# Patient Record
Sex: FEMALE | Race: WHITE | NOT HISPANIC OR LATINO | Employment: UNEMPLOYED | URBAN - METROPOLITAN AREA
[De-identification: names, ages, dates, MRNs, and addresses within clinical notes are randomized per-mention and may not be internally consistent; named-entity substitution may affect disease eponyms.]

---

## 2017-03-29 ENCOUNTER — INITIAL PRENATAL (OUTPATIENT)
Dept: OBGYN | Facility: CLINIC | Age: 27
End: 2017-03-29

## 2017-03-29 VITALS
BODY MASS INDEX: 18.96 KG/M2 | DIASTOLIC BLOOD PRESSURE: 60 MMHG | SYSTOLIC BLOOD PRESSURE: 98 MMHG | HEIGHT: 63 IN | WEIGHT: 107 LBS

## 2017-03-29 DIAGNOSIS — Z34.01 ENCOUNTER FOR SUPERVISION OF NORMAL FIRST PREGNANCY IN FIRST TRIMESTER: ICD-10-CM

## 2017-03-29 DIAGNOSIS — N93.8 DUB (DYSFUNCTIONAL UTERINE BLEEDING): ICD-10-CM

## 2017-03-29 PROCEDURE — 76830 TRANSVAGINAL US NON-OB: CPT | Performed by: OBSTETRICS & GYNECOLOGY

## 2017-03-29 PROCEDURE — 99203 OFFICE O/P NEW LOW 30 MIN: CPT | Mod: 25 | Performed by: OBSTETRICS & GYNECOLOGY
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
## 2017-03-29 NOTE — MR AVS SNAPSHOT
"Rigoberto Springer   3/29/2017 8:30 AM   Initial Prenatal   MRN: [id number]    [facility]   Dept Phone:  [phone]    Description:  Female : 1990   Provider:  Kelly Lind M.D.           Allergies as of 3/29/2017     Allergen Noted Reactions    Nkda [No Known Drug Allergy] 2010         You were diagnosed with     DUB (dysfunctional uterine bleeding)   [564981]       Encounter for supervision of normal first pregnancy in first trimester   [572157]         Vital Signs     Blood Pressure Height Weight Body Mass Index Last Menstrual Period Smoking Status    98/60 mmHg 1.6 m (5' 3\") 48.535 kg (107 lb) 18.96 kg/m2 2017 Current Every Day Smoker      Basic Information     Date Of Birth Sex Race Ethnicity Preferred Language    1990 Female White Non- English      Your appointments     Mar 30, 2017  2:00 PM   [facility] 40 [facility] [place] [facility]   [facility] [employer] [address]  [address]  [place] [address]   [phone]            Mar 30, 2017  2:40 PM   EMPLOYEE HEALTH PROVIDER (10) with Michael Montilla D.O.   [facility] [employer] [facility]    [address] [place] [address]  [address]  [place] [address]   [phone]              Problem List              ICD-10-CM Priority Class Noted - Resolved    Bipolar affective (CMS-HCC) F31.9   2009 - Present    acne Z30.09   2009 - Present    Preventative health care Z00.00   2009 - Present    Menstrual irregularity N92.6   2009 - Present    Conjunctivitis H10.9   2010 - Present      Health Maintenance        Date Due Completion Dates    IMM VARICELLA (CHICKENPOX) VACCINE (1 of 2 - 2 Dose Adolescent Series) 2005 ---    IMM INFLUENZA (1) 2016 ---    IMM DTaP/Tdap/Td Vaccine (6 - Td) 3/18/2018 3/18/2008, 7/10/1997, 1993, 1992, 1992    PAP SMEAR 2018, 2014            Current Immunizations     Dtap Vaccine 7/10/1997, " 4/1/1993, 1992, 1992    HPV Quadrivalent Vaccine (GARDASIL) 8/17/2009, 12/1/2008, 1/9/2008    Hepatitis A Vaccine, Ped/Adol 12/1/2008, 3/18/2008    Hepatitis B Vaccine Non-Recombivax (Ped/Adol) 3/17/1998, 8/29/1997, 7/10/1997    IPV 7/10/1997, 4/1/1993, 1992, 1992    MMR Vaccine 7/17/1999, 7/10/1997    Tdap Vaccine 3/18/2008    Tuberculin Skin Test 1/15/2013      Below and/or attached are the medications your provider expects you to take. Review all of your home medications and newly ordered medications with your provider and/or pharmacist. Follow medication instructions as directed by your provider and/or pharmacist. Please keep your medication list with you and share with your provider. Update the information when medications are discontinued, doses are changed, or new medications (including over-the-counter products) are added; and carry medication information at all times in the event of emergency situations     Allergies:  NKDA - (reactions not documented)               Medications  Valid as of: March 29, 2017 -  9:58 AM    Generic Name Brand Name Tablet Size Instructions for use    Albuterol Sulfate (Aero Soln) albuterol 108 (90 BASE) MCG/ACT Inhale 4-6 Puffs by mouth every four hours as needed for Shortness of Breath.        Drospirenone-Ethinyl Estradiol (Tab) ALANNA 3-0.03 MG Take 1 Tab by mouth every day.        Drospirenone-Ethinyl Estradiol (Tab) ALANNA 3-0.03 MG Take 1 Tab by mouth every day.        Hydrocodone-Acetaminophen (Tab) NORCO 5-325 MG Take 1-2 Tabs by mouth every 6 hours as needed.        Phenazopyridine HCl (Tab) PYRIDIUM 200 MG Take 1 Tab by mouth 3 times a day as needed.        Prenatal MV-Min-Fe Fum-FA-DHA   Take  by mouth.        .                 Medicines prescribed today were sent to:     [facility] - [place], [place] - [address]    [address] [place] [address]    Phone: [phone] Fax: [phone]    Open 24 Hours?: No      Medication  refill instructions:       If your prescription bottle indicates you have medication refills left, it is not necessary to call your provider’s office. Please contact your pharmacy and they will refill your medication.    If your prescription bottle indicates you do not have any refills left, you may request refills at any time through one of the following ways: The online Bionanoplus system (except Urgent Care), by calling your provider’s office, or by asking your pharmacy to contact your provider’s office with a refill request. Medication refills are processed only during regular business hours and may not be available until the next business day. Your provider may request additional information or to have a follow-up visit with you prior to refilling your medication.   *Please Note: Medication refills are assigned a new Rx number when refilled electronically. Your pharmacy may indicate that no refills were authorized even though a new prescription for the same medication is available at the pharmacy. Please request the medicine by name with the pharmacy before contacting your provider for a refill.        Referral     A referral request has been sent to our patient care coordination department. Please allow 3-5 business days for us to process this request and contact you either by phone or mail. If you do not hear from us by the 5th business day, please call us at [phone].        Other Notes About Your Plan     Ask about menactra, varicella,            [facility] Access Code: [email or web address]  Expires: 4/28/2017  9:58 AM    Bionanoplus  A secure, online tool to manage your health information     [employer]’s Bionanoplus® is a secure, online tool that connects you to your personalized health information from the privacy of your home -- day or night - making it very easy for you to manage your healthcare. Once the activation process is completed, you can even access your medical information using the Bionanoplus augusto, which  is available for free in the Apple Brady store or Google Play store.     [facility] provides the following levels of access (as shown below):   My Chart Features   [facility] Doctor [facility]  Specialists [facility]  Urgent  Care Non-[facility]  Primary Care  Doctor   Email your healthcare team securely and privately 24/7 X X X    Manage appointments: schedule your next appointment; view details of past/upcoming appointments X      Request prescription refills. X      View recent personal medical records, including lab and immunizations X X X X   View health record, including health history, allergies, medications X X X X   Read reports about your outpatient visits, procedures, consult and ER notes X X X X   See your discharge summary, which is a recap of your hospital and/or ER visit that includes your diagnosis, lab results, and care plan. X X       How to register for [facility]:  1. Go to  [email or web address].  2. Click on the Sign Up Now box, which takes you to the New Member Sign Up page. You will need to provide the following information:  a. Enter your [facility] Access Code exactly as it appears at the top of this page. (You will not need to use this code after you’ve completed the sign-up process. If you do not sign up before the expiration date, you must request a new code.)   b. Enter your date of birth.   c. Enter your home email address.   d. Click Submit, and follow the next screen’s instructions.  3. Create a [facility] ID. This will be your [facility] login ID and cannot be changed, so think of one that is secure and easy to remember.  4. Create a [facility] password. You can change your password at any time.  5. Enter your Password Reset Question and Answer. This can be used at a later time if you forget your password.   6. Enter your e-mail address. This allows you to receive e-mail notifications when new information is available in [facility].  7. Click Sign Up. You can now view your health information.    For  assistance activating your [employer] account, call [phone]        Quit Tobacco Information     Do you want to quit using tobacco?    Quitting tobacco decreases risks of cancer, heart and lung disease, increases life expectancy, improves sense of taste and smell, and increases spending money, among other benefits.    If you are thinking about quitting, we can help.  • [facility] Quit Tobacco Program: 944.741.9037  o Program occurs weekly for four weeks and includes pharmacist consultation on products to support quitting smoking or chewing tobacco. A provider referral is needed for pharmacist consultation.  • Tobacco Users Help Hotline: -800QUIT-NOW (859-3951) or https://[place].quitlogix.org/  o Free, confidential telephone and online coaching for [place] residents. Sessions are designed on a schedule that is convenient for you. Eligible clients receive free nicotine replacement therapy.  • Nationally: www.smokefree.gov  o Information and professional assistance to support both immediate and long-term needs as you become, and remain, a non-smoker. Smokefree.gov allows you to choose the help that best fits your needs.

## 2017-03-30 ENCOUNTER — HOSPITAL ENCOUNTER (OUTPATIENT)
Facility: MEDICAL CENTER | Age: 27
End: 2017-03-30
Attending: PREVENTIVE MEDICINE
Payer: COMMERCIAL

## 2017-03-30 ENCOUNTER — EMPLOYEE HEALTH (OUTPATIENT)
Dept: OCCUPATIONAL MEDICINE | Facility: CLINIC | Age: 27
End: 2017-03-30

## 2017-03-30 ENCOUNTER — EH NON-PROVIDER (OUTPATIENT)
Dept: OCCUPATIONAL MEDICINE | Facility: CLINIC | Age: 27
End: 2017-03-30

## 2017-03-30 DIAGNOSIS — Z02.1 PHYSICAL EXAM, PRE-EMPLOYMENT: ICD-10-CM

## 2017-03-30 DIAGNOSIS — Z02.1 PRE-EMPLOYMENT DRUG SCREENING: ICD-10-CM

## 2017-03-30 LAB
AMP AMPHETAMINE: NORMAL
BAR BARBITURATES: NORMAL
BZO BENZODIAZEPINES: NORMAL
COC COCAINE: NORMAL
INT CON NEG: NORMAL
INT CON POS: NORMAL
MDMA ECSTASY: NORMAL
MET METHAMPHETAMINES: NORMAL
MTD METHADONE: NORMAL
OPI OPIATES: NORMAL
OXY OXYCODONE: NORMAL
PCP PHENCYCLIDINE: NORMAL
POC URINE DRUG SCREEN OCDRS: NEGATIVE
THC: NORMAL

## 2017-03-30 PROCEDURE — 86787 VARICELLA-ZOSTER ANTIBODY: CPT | Performed by: PREVENTIVE MEDICINE

## 2017-03-30 PROCEDURE — 80305 DRUG TEST PRSMV DIR OPT OBS: CPT | Performed by: PREVENTIVE MEDICINE

## 2017-03-30 PROCEDURE — 86480 TB TEST CELL IMMUN MEASURE: CPT | Performed by: PREVENTIVE MEDICINE

## 2017-03-30 PROCEDURE — 99204 OFFICE O/P NEW MOD 45 MIN: CPT | Performed by: PREVENTIVE MEDICINE

## 2017-03-30 NOTE — MR AVS SNAPSHOT
Rigoberto Springer   3/30/2017 2:40 PM   Employee Health   MRN: [id number]    Department:  [facility]   Dept Phone:  [phone]    Description:  Female : 1990   Provider:  Michael Montilla D.O.           Allergies as of 3/30/2017     Allergen Noted Reactions    Nkda [No Known Drug Allergy] 2010         You were diagnosed with     Physical exam, pre-employment   [008671]         Vital Signs     Last Menstrual Period Smoking Status                2017 Current Every Day Smoker          Basic Information     Date Of Birth Sex Race Ethnicity Preferred Language    1990 Female White Non- English      Problem List              ICD-10-CM Priority Class Noted - Resolved    Bipolar affective (CMS-HCC) F31.9   2009 - Present    acne Z30.09   2009 - Present    Preventative health care Z00.00   2009 - Present    Menstrual irregularity N92.6   2009 - Present    Conjunctivitis H10.9   2010 - Present      Health Maintenance        Date Due Completion Dates    IMM VARICELLA (CHICKENPOX) VACCINE (1 of 2 - 2 Dose Adolescent Series) 2005 ---    IMM INFLUENZA (1) 2016 ---    IMM DTaP/Tdap/Td Vaccine (6 - Td) 3/18/2018 3/18/2008, 7/10/1997, 1993, 1992, 1992    PAP SMEAR 2018, 2014            Current Immunizations     Dtap Vaccine 7/10/1997, 1993, 1992, 1992    HPV Quadrivalent Vaccine (GARDASIL) 2009, 2008, 2008    Hepatitis A Vaccine, Ped/Adol 2008, 3/18/2008    Hepatitis B Vaccine Non-Recombivax (Ped/Adol) 3/17/1998, 1997, 7/10/1997    IPV 7/10/1997, 1993, 1992, 1992    MMR Vaccine 1999, 7/10/1997    Tdap Vaccine 3/18/2008    Tuberculin Skin Test 1/15/2013      Below and/or attached are the medications your provider expects you to take. Review all of your home medications and newly ordered medications with your provider and/or pharmacist. Follow medication  instructions as directed by your provider and/or pharmacist. Please keep your medication list with you and share with your provider. Update the information when medications are discontinued, doses are changed, or new medications (including over-the-counter products) are added; and carry medication information at all times in the event of emergency situations     Allergies:  NKDA - (reactions not documented)               Medications  Valid as of: March 30, 2017 -  3:16 PM    Generic Name Brand Name Tablet Size Instructions for use    Albuterol Sulfate (Aero Soln) albuterol 108 (90 BASE) MCG/ACT Inhale 4-6 Puffs by mouth every four hours as needed for Shortness of Breath.        Drospirenone-Ethinyl Estradiol (Tab) ALANNA 3-0.03 MG Take 1 Tab by mouth every day.        Drospirenone-Ethinyl Estradiol (Tab) ALANNA 3-0.03 MG Take 1 Tab by mouth every day.        Hydrocodone-Acetaminophen (Tab) NORCO 5-325 MG Take 1-2 Tabs by mouth every 6 hours as needed.        Phenazopyridine HCl (Tab) PYRIDIUM 200 MG Take 1 Tab by mouth 3 times a day as needed.        Prenatal MV-Min-Fe Fum-FA-DHA   Take  by mouth.        .                 Medicines prescribed today were sent to:     [facility] [address] [place] [address], [place] - [address]    [address] [place] [address]    Phone: [phone] Fax: [phone]    Open 24 Hours?: No      Medication refill instructions:       If your prescription bottle indicates you have medication refills left, it is not necessary to call your provider’s office. Please contact your pharmacy and they will refill your medication.    If your prescription bottle indicates you do not have any refills left, you may request refills at any time through one of the following ways: The online [employer] system (except Urgent Care), by calling your provider’s office, or by asking your pharmacy to contact your provider’s office with a refill request. Medication refills are processed only during regular  business hours and may not be available until the next business day. Your provider may request additional information or to have a follow-up visit with you prior to refilling your medication.   *Please Note: Medication refills are assigned a new Rx number when refilled electronically. Your pharmacy may indicate that no refills were authorized even though a new prescription for the same medication is available at the pharmacy. Please request the medicine by name with the pharmacy before contacting your provider for a refill.        Other Notes About Your Plan     Ask about menactra, varicella,            MyChart Access Code: Activation code not generated  Current MyChart Status: Active          Quit Tobacco Information     Do you want to quit using tobacco?    Quitting tobacco decreases risks of cancer, heart and lung disease, increases life expectancy, improves sense of taste and smell, and increases spending money, among other benefits.    If you are thinking about quitting, we can help.  • [facility] Quit Tobacco [facility]: [phone]  o Program occurs weekly for four weeks and includes pharmacist consultation on products to support quitting smoking or chewing tobacco. A provider referral is needed for pharmacist consultation.  • Tobacco Users Help Hotline: 3-116-QUITNOW (900-5583) or https://[place].quitlogix.org/  o Free, confidential telephone and online coaching for [place] residents. Sessions are designed on a schedule that is convenient for you. Eligible clients receive free nicotine replacement therapy.  • Nationally: www.smokefree.gov  o Information and professional assistance to support both immediate and long-term needs as you become, and remain, a non-smoker. Smokefree.gov allows you to choose the help that best fits your needs.

## 2017-04-01 LAB — VZV IGG SER IA-ACNC: 195 IV

## 2017-04-03 LAB
M TB TUBERC IFN-G BLD QL: NEGATIVE
M TB TUBERC IFN-G/MITOGEN IGNF BLD: -0.01
M TB TUBERC IGNF/MITOGEN IGNF CONTROL: 48.49 [IU]/ML
MITOGEN IGNF BCKGRD COR BLD-ACNC: 0.03 [IU]/ML

## 2017-04-03 NOTE — PROGRESS NOTES
Referral faxed to [facility] on 4/3/17  They will contact patient to schedule appt.  Please check with patient if appt was made/ document. Thank you

## 2017-05-09 ENCOUNTER — HOSPITAL ENCOUNTER (OUTPATIENT)
Facility: MEDICAL CENTER | Age: 27
End: 2017-05-09
Attending: NURSE PRACTITIONER
Payer: MEDICAID

## 2017-05-09 ENCOUNTER — INITIAL PRENATAL (OUTPATIENT)
Dept: OBGYN | Facility: CLINIC | Age: 27
End: 2017-05-09
Payer: MEDICAID

## 2017-05-09 VITALS — BODY MASS INDEX: 18.96 KG/M2 | SYSTOLIC BLOOD PRESSURE: 100 MMHG | DIASTOLIC BLOOD PRESSURE: 60 MMHG | WEIGHT: 107 LBS

## 2017-05-09 DIAGNOSIS — J45.30 MILD PERSISTENT ASTHMA WITHOUT COMPLICATION: ICD-10-CM

## 2017-05-09 DIAGNOSIS — Z34.02 SUPERVISION OF NORMAL FIRST PREGNANCY IN SECOND TRIMESTER: ICD-10-CM

## 2017-05-09 DIAGNOSIS — Z34.02 SUPERVISION OF NORMAL FIRST PREGNANCY IN SECOND TRIMESTER: Primary | ICD-10-CM

## 2017-05-09 DIAGNOSIS — Z34.00 SUPERVISION OF NORMAL FIRST PREGNANCY, ANTEPARTUM: ICD-10-CM

## 2017-05-09 DIAGNOSIS — B00.9 HSV-1 (HERPES SIMPLEX VIRUS 1) INFECTION: ICD-10-CM

## 2017-05-09 LAB
APPEARANCE UR: NORMAL
BILIRUB UR STRIP-MCNC: NORMAL MG/DL
COLOR UR AUTO: NORMAL
GLUCOSE UR STRIP.AUTO-MCNC: NEGATIVE MG/DL
KETONES UR STRIP.AUTO-MCNC: NEGATIVE MG/DL
LEUKOCYTE ESTERASE UR QL STRIP.AUTO: NEGATIVE
NITRITE UR QL STRIP.AUTO: NEGATIVE
PH UR STRIP.AUTO: 7.5 [PH] (ref 5–8)
PROT UR QL STRIP: NORMAL MG/DL
RBC UR QL AUTO: NEGATIVE
SP GR UR STRIP.AUTO: 1
UROBILINOGEN UR STRIP-MCNC: NORMAL MG/DL

## 2017-05-09 PROCEDURE — 81002 URINALYSIS NONAUTO W/O SCOPE: CPT | Performed by: NURSE PRACTITIONER

## 2017-05-09 PROCEDURE — 88175 CYTOPATH C/V AUTO FLUID REDO: CPT

## 2017-05-09 PROCEDURE — 87591 N.GONORRHOEAE DNA AMP PROB: CPT

## 2017-05-09 PROCEDURE — 59401 PR NEW OB VISIT: CPT | Performed by: NURSE PRACTITIONER

## 2017-05-09 PROCEDURE — 87491 CHLMYD TRACH DNA AMP PROBE: CPT

## 2017-05-09 ASSESSMENT — ENCOUNTER SYMPTOMS
MUSCULOSKELETAL NEGATIVE: 1
RESPIRATORY NEGATIVE: 1
NEUROLOGICAL NEGATIVE: 1
EYES NEGATIVE: 1
PSYCHIATRIC NEGATIVE: 1
CARDIOVASCULAR NEGATIVE: 1
GASTROINTESTINAL NEGATIVE: 1
CONSTITUTIONAL NEGATIVE: 1

## 2017-05-09 NOTE — Clinical Note
May 9, 2017      Rigoberto Springer is currently pregnant and being cared for by [facility].     She is medically cleared for:    1. Dental exams and routine cleaning  2. Tooth fillings and extractions as needed  3. Antibiotic therapy as appropriate  4. Local anesthesia  5. X-ray with abdominal shield.    Patient may be administered the followin% Lidocaine with 1:100,000 Epinephrine  4% Septocaine with 1:100,000 Epinephrine  Nitrous Oxide  A narcotic or non-narcotic pain medication  An antibiotic such as penicillin or clindamycin    NO TETRACYCLINE and NO CODEINE, NO IBUPROFEN        Thank you,                  Tiarra Toney C.N.M.

## 2017-05-09 NOTE — MR AVS SNAPSHOT
Rigoberto Springer   2017 9:00 AM   Initial Prenatal   MRN: [id number]    [facility]   Dept Phone:  [phone]    Description:  Female : 1990   Provider:  Tiarra Toney C.N.M.; PC INTAKE           Allergies as of 2017     Allergen Noted Reactions    Nkda [No Known Drug Allergy] 2010         You were diagnosed with     Supervision of normal first pregnancy in second trimester   [3554574]  -  Primary     Supervision of normal first pregnancy, antepartum   [1050441]       HSV-1 (herpes simplex virus 1) infection   [868693]       Mild persistent asthma without complication   [527565]         Vital Signs     Blood Pressure Weight Last Menstrual Period Smoking Status          100/60 mmHg 48.535 kg (107 lb) 2017 Current Every Day Smoker        Basic Information     Date Of Birth Sex Race Ethnicity Preferred Language    1990 Female White Non- English      Your appointments     2017  4:30 PM   OB Follow Up with Tiarra Toney C.N.M.   [facility] [address] [address] [phone]              Problem List              ICD-10-CM Priority Class Noted - Resolved    Bipolar affective (CMS-HCC) F31.9   2009 - Present    acne Z30.09   2009 - Present    Preventative health care Z00.00   2009 - Present    Supervision of normal first pregnancy in second trimester Z34.02 Medium  2017 - Present    HSV-1 (herpes simplex virus 1) infection B00.9 Medium  2017 - Present    Mild persistent asthma without complication J45.30 Medium  2017 - Present      Health Maintenance        Date Due Completion Dates    IMM VARICELLA (CHICKENPOX) VACCINE (1 of 2 - 2 Dose Adolescent Series) 2005 ---    IMM DTaP/Tdap/Td Vaccine (6 - Td) 3/18/2018 3/18/2008, 7/10/1997, 1993, 1992, 1992    PAP SMEAR 2018, 2014            Results     POCT Urinalysis      Component  Value Standard Range & Units    POC Color  Negative    POC Appearance  Negative    POC Leukocyte Esterase negative Negative    POC Nitrites negative Negative    POC Urobiligen  Negative (0.2) mg/dL    POC Protein trace Negative mg/dL    POC Urine PH 7.5 5.0 - 8.0    POC Blood negative Negative    POC Specific Gravity 1.005 <1.005 - >1.030    POC Ketones negative Negative mg/dL    POC Biliruben  Negative mg/dL    POC Glucose negative Negative mg/dL                        Current Immunizations     Dtap Vaccine 7/10/1997, 4/1/1993, 1992, 1992    HPV Quadrivalent Vaccine (GARDASIL) 8/17/2009, 12/1/2008, 1/9/2008    Hepatitis A Vaccine, Ped/Adol 12/1/2008, 3/18/2008    Hepatitis B Vaccine Non-Recombivax (Ped/Adol) 3/17/1998, 8/29/1997, 7/10/1997    IPV 7/10/1997, 4/1/1993, 1992, 1992    MMR Vaccine 7/17/1999, 7/10/1997    Tdap Vaccine 3/18/2008    Tuberculin Skin Test 1/15/2013      Below and/or attached are the medications your provider expects you to take. Review all of your home medications and newly ordered medications with your provider and/or pharmacist. Follow medication instructions as directed by your provider and/or pharmacist. Please keep your medication list with you and share with your provider. Update the information when medications are discontinued, doses are changed, or new medications (including over-the-counter products) are added; and carry medication information at all times in the event of emergency situations     Allergies:  NKDA - (reactions not documented)               Medications  Valid as of: May 09, 2017 - 10:07 AM    Generic Name Brand Name Tablet Size Instructions for use    Albuterol Sulfate (Aero Soln) albuterol 108 (90 BASE) MCG/ACT Inhale 4-6 Puffs by mouth every four hours as needed for Shortness of Breath.        Drospirenone-Ethinyl Estradiol (Tab) ALANNA 3-0.03 MG Take 1 Tab by mouth every day.        Drospirenone-Ethinyl Estradiol (Tab) ALANNA 3-0.03 MG Take 1 Tab  by mouth every day.        Hydrocodone-Acetaminophen (Tab) NORCO 5-325 MG Take 1-2 Tabs by mouth every 6 hours as needed.        Phenazopyridine HCl (Tab) PYRIDIUM 200 MG Take 1 Tab by mouth 3 times a day as needed.        Prenatal MV-Min-Fe Fum-FA-DHA   Take  by mouth.        .                 Medicines prescribed today were sent to:     [facility] [address] [place] [address], [place] - [address]    [address] [place] [address]    Phone: [phone] Fax: [phone]    Open 24 Hours?: No      Medication refill instructions:       If your prescription bottle indicates you have medication refills left, it is not necessary to call your provider’s office. Please contact your pharmacy and they will refill your medication.    If your prescription bottle indicates you do not have any refills left, you may request refills at any time through one of the following ways: The online [employer] system (except Urgent Care), by calling your provider’s office, or by asking your pharmacy to contact your provider’s office with a refill request. Medication refills are processed only during regular business hours and may not be available until the next business day. Your provider may request additional information or to have a follow-up visit with you prior to refilling your medication.   *Please Note: Medication refills are assigned a new Rx number when refilled electronically. Your pharmacy may indicate that no refills were authorized even though a new prescription for the same medication is available at the pharmacy. Please request the medicine by name with the pharmacy before contacting your provider for a refill.        Your To Do List     Future Labs/Procedures Complete By Expires    AFP TETRA  As directed 5/10/2018    PREG CNTR PRENATAL PN  As directed 5/10/2018    THINPREP RFLX HPV ASCUS W/CTNG  As directed 5/10/2018    US-OB 2ND 3RD TRI COMPLETE  As directed 11/9/2017      Referral     A referral request has been sent  to our patient care coordination department. Please allow 3-5 business days for us to process this request and contact you either by phone or mail. If you do not hear from us by the 5th business day, please call us at [phone].           Brevity Access Code: Activation code not generated  Current NebuAdhart Status: Active          Quit Tobacco Information     Do you want to quit using tobacco?    Quitting tobacco decreases risks of cancer, heart and lung disease, increases life expectancy, improves sense of taste and smell, and increases spending money, among other benefits.    If you are thinking about quitting, we can help.  • [facility] Quit Tobacco Program: [phone]  o Program occurs weekly for four weeks and includes pharmacist consultation on products to support quitting smoking or chewing tobacco. A provider referral is needed for pharmacist consultation.  • Tobacco Users Help Hotline: 0-445-QUIT-NOW (571-7555) or https://[place].quitlogix.org/  o Free, confidential telephone and online coaching for [place] residents. Sessions are designed on a schedule that is convenient for you. Eligible clients receive free nicotine replacement therapy.  • Nationally: www.smokefree.gov  o Information and professional assistance to support both immediate and long-term needs as you become, and remain, a non-smoker. Smokefree.gov allows you to choose the help that best fits your needs.

## 2017-05-09 NOTE — PROGRESS NOTES
Pt. Here for NOB visit today.  # [phone]  Pt had a  visit on 3/29/17  Pt. states having some nausea and some sharp pains in abdomen are denies bleeding.   Pharmacy verified.   Pt states didn't have insurance at the time for appt with TATIANA.
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
## 2017-05-11 LAB
C TRACH DNA GENITAL QL NAA+PROBE: NEGATIVE
CYTOLOGY REG CYTOL: NORMAL
N GONORRHOEA DNA GENITAL QL NAA+PROBE: NEGATIVE
SPECIMEN SOURCE: NORMAL

## 2017-05-23 ENCOUNTER — HOSPITAL ENCOUNTER (OUTPATIENT)
Dept: LAB | Facility: MEDICAL CENTER | Age: 27
End: 2017-05-23
Attending: NURSE PRACTITIONER
Payer: MEDICAID

## 2017-05-23 DIAGNOSIS — Z34.02 SUPERVISION OF NORMAL FIRST PREGNANCY IN SECOND TRIMESTER: ICD-10-CM

## 2017-05-23 LAB
ABO GROUP BLD: NORMAL
APPEARANCE UR: ABNORMAL
BACTERIA #/AREA URNS HPF: ABNORMAL /HPF
BASOPHILS # BLD AUTO: 0.9 % (ref 0–1.8)
BASOPHILS # BLD: 0.1 K/UL (ref 0–0.12)
BILIRUB UR QL STRIP.AUTO: NEGATIVE
BLD GP AB SCN SERPL QL: NORMAL
COLOR UR: YELLOW
CULTURE IF INDICATED INDCX: YES UA CULTURE
EOSINOPHIL # BLD AUTO: 0.15 K/UL (ref 0–0.51)
EOSINOPHIL NFR BLD: 1.3 % (ref 0–6.9)
EPI CELLS #/AREA URNS HPF: ABNORMAL /HPF
ERYTHROCYTE [DISTWIDTH] IN BLOOD BY AUTOMATED COUNT: 45.2 FL (ref 35.9–50)
GLUCOSE UR STRIP.AUTO-MCNC: NEGATIVE MG/DL
HBV SURFACE AG SER QL: NEGATIVE
HCT VFR BLD AUTO: 39 % (ref 37–47)
HGB BLD-MCNC: 13.2 G/DL (ref 12–16)
HIV 1+2 AB+HIV1 P24 AG SERPL QL IA: NON REACTIVE
IMM GRANULOCYTES # BLD AUTO: 0.03 K/UL (ref 0–0.11)
IMM GRANULOCYTES NFR BLD AUTO: 0.3 % (ref 0–0.9)
KETONES UR STRIP.AUTO-MCNC: 10 MG/DL
LEUKOCYTE ESTERASE UR QL STRIP.AUTO: ABNORMAL
LYMPHOCYTES # BLD AUTO: 2.34 K/UL (ref 1–4.8)
LYMPHOCYTES NFR BLD: 20.7 % (ref 22–41)
MCH RBC QN AUTO: 30.9 PG (ref 27–33)
MCHC RBC AUTO-ENTMCNC: 33.8 G/DL (ref 33.6–35)
MCV RBC AUTO: 91.3 FL (ref 81.4–97.8)
MICRO URNS: ABNORMAL
MONOCYTES # BLD AUTO: 0.55 K/UL (ref 0–0.85)
MONOCYTES NFR BLD AUTO: 4.9 % (ref 0–13.4)
MUCOUS THREADS #/AREA URNS HPF: ABNORMAL /HPF
NEUTROPHILS # BLD AUTO: 8.14 K/UL (ref 2–7.15)
NEUTROPHILS NFR BLD: 71.9 % (ref 44–72)
NITRITE UR QL STRIP.AUTO: NEGATIVE
NRBC # BLD AUTO: 0 K/UL
NRBC BLD AUTO-RTO: 0 /100 WBC
PH UR STRIP.AUTO: 6 [PH]
PLATELET # BLD AUTO: 267 K/UL (ref 164–446)
PMV BLD AUTO: 10.4 FL (ref 9–12.9)
PROT UR QL STRIP: NEGATIVE MG/DL
RBC # BLD AUTO: 4.27 M/UL (ref 4.2–5.4)
RBC # URNS HPF: ABNORMAL /HPF
RBC UR QL AUTO: NEGATIVE
RH BLD: NORMAL
RUBV AB SER QL: 131.8 IU/ML
SP GR UR STRIP.AUTO: 1.01
TREPONEMA PALLIDUM IGG+IGM AB [PRESENCE] IN SERUM OR PLASMA BY IMMUNOASSAY: NON REACTIVE
WBC # BLD AUTO: 11.3 K/UL (ref 4.8–10.8)
WBC #/AREA URNS HPF: ABNORMAL /HPF

## 2017-05-23 PROCEDURE — 36415 COLL VENOUS BLD VENIPUNCTURE: CPT

## 2017-05-23 PROCEDURE — 81001 URINALYSIS AUTO W/SCOPE: CPT

## 2017-05-23 PROCEDURE — 87340 HEPATITIS B SURFACE AG IA: CPT

## 2017-05-23 PROCEDURE — 86780 TREPONEMA PALLIDUM: CPT

## 2017-05-23 PROCEDURE — 86762 RUBELLA ANTIBODY: CPT

## 2017-05-23 PROCEDURE — 86901 BLOOD TYPING SEROLOGIC RH(D): CPT

## 2017-05-23 PROCEDURE — 87086 URINE CULTURE/COLONY COUNT: CPT

## 2017-05-23 PROCEDURE — 86900 BLOOD TYPING SEROLOGIC ABO: CPT

## 2017-05-23 PROCEDURE — 86850 RBC ANTIBODY SCREEN: CPT

## 2017-05-23 PROCEDURE — 87389 HIV-1 AG W/HIV-1&-2 AB AG IA: CPT

## 2017-05-23 PROCEDURE — 85025 COMPLETE CBC W/AUTO DIFF WBC: CPT

## 2017-05-23 PROCEDURE — 81511 FTL CGEN ABNOR FOUR ANAL: CPT

## 2017-05-25 LAB
# FETUSES US: NORMAL
AFP MOM SERPL: 1
AFP SERPL-MCNC: 40 NG/ML
AGE - REPORTED: 25.3 YR
BACTERIA UR CULT: NORMAL
GA METHOD: NORMAL
GA: 16 WEEKS
HCG MOM SERPL: 0.74
HCG SERPL-ACNC: NORMAL IU/L
IDDM PATIENT QL: NO
INHIBIN A MOM SERPL: 1.86
INHIBIN A SERPL-MCNC: 363 PG/ML
INTEGRATED SCN PATIENT-IMP: NORMAL
PATHOLOGY STUDY: NORMAL
SIGNIFICANT IND 70042: NORMAL
SOURCE SOURCE: NORMAL
U ESTRIOL MOM SERPL: 0.97
U ESTRIOL SERPL-MCNC: 0.91 NG/ML

## 2017-05-26 ENCOUNTER — TELEPHONE (OUTPATIENT)
Dept: OBGYN | Facility: CLINIC | Age: 27
End: 2017-05-26

## 2017-05-26 NOTE — TELEPHONE ENCOUNTER
----- Message from Tiarra Toney C.N.M. sent at 5/25/2017  7:55 AM PDT -----  Please let patient know that this (HIV) result is normal    Pt notified.

## 2017-06-06 ENCOUNTER — ROUTINE PRENATAL (OUTPATIENT)
Dept: OBGYN | Facility: CLINIC | Age: 27
End: 2017-06-06
Payer: MEDICAID

## 2017-06-06 VITALS — SYSTOLIC BLOOD PRESSURE: 116 MMHG | DIASTOLIC BLOOD PRESSURE: 62 MMHG | BODY MASS INDEX: 19.84 KG/M2 | WEIGHT: 112 LBS

## 2017-06-06 DIAGNOSIS — Z34.02 SUPERVISION OF NORMAL FIRST PREGNANCY IN SECOND TRIMESTER: ICD-10-CM

## 2017-06-06 PROCEDURE — 90040 PR PRENATAL FOLLOW UP: CPT | Performed by: NURSE PRACTITIONER

## 2017-06-06 NOTE — MR AVS SNAPSHOT
Rigoberto Springer   2017 3:00 PM   Routine Prenatal   MRN: [id number]    [facility]   Dept Phone:  [phone]    Description:  Female : 1990   Provider:  Tiarra Toney C.N.M.           Allergies as of 2017     Allergen Noted Reactions    Nkda [No Known Drug Allergy] 2010         You were diagnosed with     Supervision of normal first pregnancy in second trimester   [1607365]         Vital Signs     Blood Pressure Weight Last Menstrual Period Smoking Status          116/62 mmHg 50.803 kg (112 lb) 2017 Current Every Day Smoker        Basic Information     Date Of Birth Sex Race Ethnicity Preferred Language    1990 Female White Non- English      Your appointments     2017  2:30 PM   US PREG 60 with PREG CTR US 1   [facility] ([facility])    [facility] [place] [address] [address] [address] [phone]           For [facility] patients only: 1. Please arrive 15 min prior to your appointment time. 2. If you're late, you will be rescheduled for the next available appointment. 3. If you need to reschedule your appointment, please call us at [phone] 48 hours prior to your appointment. 4. Do not bring children as they will not be allowed in exam room. 5. Only one family member may be present in room during exam. 6. The exam will be 30-60 minutes depending on the exam ordered by the physician. 7. The sonographer is not allowed to discuss findings during the exam. Your provider will go over the results with you at your next appointment. 8. The purpose of this ultrasound is to determine if baby is healthy. Diagnostic ultrasounds are NOT to determine the gender of the baby. 9. NO photography or video recording is allowed in exam room. 10. NO cell phones allowed in the exam room. INFORMACION SOBRE SANTIAGO ULTRASONIDO 1. Por favor de llegar 15 minutos antes de santiago argelia. 2. Si  llega tarde, le tenemos que cambiar la argelia para otra fecha. 3. Si necesita cambiar oleary argelia, por favor llame 48 horas antes de la argelia. [phone] 4. Por favor no traer niños. No se permiten en cuarto de Ultrasonido. 5. Solamente se permite matthias persona en el cuarto georgiana el examen. 6. El examen dura 30-60 minutos, dependiendo del examen ordenado por el Doctor. 7. El Sonógrafo no está autorizado hablar sobre oleary examen. Oleary doctor o partera le va explicar los resultados en oleary próxima argelia. 8. El propósito del Ultrasonido es para determinar si oleary francisco viene saludable. No es para determinar el sexo de oleary francisco. 9. Por favor no fotos o cámaras de grabar. 10. No celulares permitidos en el cuarto de examen.            Jul 03, 2017  3:00 PM   OB Follow Up with Tiarra Toney C.N.M.   [facility] [address] [address]   [phone]              Problem List              ICD-10-CM Priority Class Noted - Resolved    Bipolar affective (CMS-HCC) F31.9   8/17/2009 - Present    acne Z30.09   8/17/2009 - Present    Preventative health care Z00.00   8/17/2009 - Present    Supervision of normal first pregnancy in second trimester Z34.02 Medium  5/9/2017 - Present    HSV-1 (herpes simplex virus 1) infection B00.9 Medium  5/9/2017 - Present    Mild persistent asthma without complication J45.30 Medium  5/9/2017 - Present      Health Maintenance        Date Due Completion Dates    IMM VARICELLA (CHICKENPOX) VACCINE (1 of 2 - 2 Dose Adolescent Series) 6/28/2005 ---    IMM PNEUMOCOCCAL 19-64 (ADULT) MEDIUM RISK SERIES (1 of 1 - PPSV23) 6/28/2011 ---    IMM DTaP/Tdap/Td Vaccine (6 - Td) 3/18/2018 3/18/2008, 7/10/1997, 4/1/1993, 1992, 1992    PAP SMEAR 5/9/2020 5/9/2017, 9/30/2015, 7/9/2014            Current Immunizations     Dtap Vaccine 7/10/1997, 4/1/1993, 1992, 1992    HPV Quadrivalent Vaccine (GARDASIL) 8/17/2009, 12/1/2008, 1/9/2008    Hepatitis A Vaccine, Ped/Adol  12/1/2008, 3/18/2008    Hepatitis B Vaccine Non-Recombivax (Ped/Adol) 3/17/1998, 8/29/1997, 7/10/1997    IPV 7/10/1997, 4/1/1993, 1992, 1992    MMR Vaccine 7/17/1999, 7/10/1997    Tdap Vaccine 3/18/2008    Tuberculin Skin Test 1/15/2013      Below and/or attached are the medications your provider expects you to take. Review all of your home medications and newly ordered medications with your provider and/or pharmacist. Follow medication instructions as directed by your provider and/or pharmacist. Please keep your medication list with you and share with your provider. Update the information when medications are discontinued, doses are changed, or new medications (including over-the-counter products) are added; and carry medication information at all times in the event of emergency situations     Allergies:  NKDA - (reactions not documented)               Medications  Valid as of: June 06, 2017 -  3:26 PM    Generic Name Brand Name Tablet Size Instructions for use    Albuterol Sulfate (Aero Soln) albuterol 108 (90 BASE) MCG/ACT Inhale 4-6 Puffs by mouth every four hours as needed for Shortness of Breath.        Drospirenone-Ethinyl Estradiol (Tab) ALANNA 3-0.03 MG Take 1 Tab by mouth every day.        Drospirenone-Ethinyl Estradiol (Tab) ALANNA 3-0.03 MG Take 1 Tab by mouth every day.        Hydrocodone-Acetaminophen (Tab) NORCO 5-325 MG Take 1-2 Tabs by mouth every 6 hours as needed.        Phenazopyridine HCl (Tab) PYRIDIUM 200 MG Take 1 Tab by mouth 3 times a day as needed.        Prenatal MV-Min-Fe Fum-FA-DHA   Take  by mouth.        .                 Medicines prescribed today were sent to:     [facility] - [place], [place] - [address]    [address] [place] [address]    Phone: [phone] Fax: [phone]    Open 24 Hours?: No      Medication refill instructions:       If your prescription bottle indicates you have medication refills left, it is not necessary to call your  provider’s office. Please contact your pharmacy and they will refill your medication.    If your prescription bottle indicates you do not have any refills left, you may request refills at any time through one of the following ways: The online [employer] system (except Urgent Care), by calling your provider’s office, or by asking your pharmacy to contact your provider’s office with a refill request. Medication refills are processed only during regular business hours and may not be available until the next business day. Your provider may request additional information or to have a follow-up visit with you prior to refilling your medication.   *Please Note: Medication refills are assigned a new Rx number when refilled electronically. Your pharmacy may indicate that no refills were authorized even though a new prescription for the same medication is available at the pharmacy. Please request the medicine by name with the pharmacy before contacting your provider for a refill.        Other Notes About Your Plan     PNL WNL, AFP WNL           MyChart Access Code: Activation code not generated  Current [employer]ioMx Status: Active          Quit Tobacco Information     Do you want to quit using tobacco?    Quitting tobacco decreases risks of cancer, heart and lung disease, increases life expectancy, improves sense of taste and smell, and increases spending money, among other benefits.    If you are thinking about quitting, we can help.  • [facility] Quit Tobacco Program: [phone]  o Program occurs weekly for four weeks and includes pharmacist consultation on products to support quitting smoking or chewing tobacco. A provider referral is needed for pharmacist consultation.  • Tobacco Users Help Hotline: 1-315-QUIT-NOW (597-4700) or https://[place].quitlogix.org/  o Free, confidential telephone and online coaching for [place] residents. Sessions are designed on a schedule that is convenient for you. Eligible clients receive free nicotine  replacement therapy.  • Nationally: www.smokefree.gov  o Information and professional assistance to support both immediate and long-term needs as you become, and remain, a non-smoker. Smokefree.gov allows you to choose the help that best fits your needs.

## 2017-06-06 NOTE — PROGRESS NOTES
S) Pt is a 26 y.o.   at 18w0d  gestation. Routine prenatal care today. No complaints today. Just started feeling movement.    Fetal movement Normal  Cramping no,  VB no  LOF no   Denies dysuria. Generally feels well today. Good self-care activities identified. Denies headaches, swelling, visual changes, or epigastric pain .     O) Blood pressure 116/62, weight 50.803 kg (112 lb), last menstrual period 2017.        Labs:       PNL: WNL       GCT: Not done yet       AFP: Normal       GBS: N/A       Pertinent ultrasound -        Scheduled 17    A) IUP at 18w0d       S=D         Patient Active Problem List    Diagnosis Date Noted   • Supervision of normal first pregnancy in second trimester 2017     Priority: Medium   • HSV-1 (herpes simplex virus 1) infection 2017     Priority: Medium   • Mild persistent asthma without complication 2017     Priority: Medium   • Bipolar affective (CMS-[facility]) 2009   • acne 2009   • Preventative health care 2009                 TDAP: not yet       BTL: no       : n/a    P) s/s ptl vs general discomforts. Fetal movements reviewed. General ed and anticipatory guidance. Nutrition/exercise/vitamin. Plans breast Plans pp contraception- unsure.  Continue PNV.

## 2017-06-06 NOTE — PROGRESS NOTES
Ob F/U  Pt c/o swelling in legs and feet.   Good phone [phone]  U/S scheduled for 6/21/2017  PNP and AFP done

## 2017-06-21 ENCOUNTER — HOSPITAL ENCOUNTER (OUTPATIENT)
Dept: RADIOLOGY | Facility: MEDICAL CENTER | Age: 27
End: 2017-06-21
Attending: NURSE PRACTITIONER
Payer: MEDICAID

## 2017-06-21 DIAGNOSIS — Z34.02 SUPERVISION OF NORMAL FIRST PREGNANCY IN SECOND TRIMESTER: ICD-10-CM

## 2017-06-21 PROCEDURE — 76805 OB US >/= 14 WKS SNGL FETUS: CPT

## 2017-06-22 ENCOUNTER — DATING (OUTPATIENT)
Dept: OBGYN | Facility: CLINIC | Age: 27
End: 2017-06-22

## 2017-07-03 ENCOUNTER — ROUTINE PRENATAL (OUTPATIENT)
Dept: OBGYN | Facility: CLINIC | Age: 27
End: 2017-07-03
Payer: MEDICAID

## 2017-07-03 VITALS — BODY MASS INDEX: 20.91 KG/M2 | WEIGHT: 118 LBS | DIASTOLIC BLOOD PRESSURE: 60 MMHG | SYSTOLIC BLOOD PRESSURE: 102 MMHG

## 2017-07-03 DIAGNOSIS — Z34.02 SUPERVISION OF NORMAL FIRST PREGNANCY IN SECOND TRIMESTER: ICD-10-CM

## 2017-07-03 PROCEDURE — 90040 PR PRENATAL FOLLOW UP: CPT | Performed by: NURSE PRACTITIONER

## 2017-07-03 NOTE — PROGRESS NOTES
OB f/u. + fetal movement.  No VB, LOF or UC's.  Wt: 118 lbs       BP: 102/60  Good phone # [phone]  Preferred pharmacy confirmed.  Pulmonology referral: patient given information to schedule appt.

## 2017-07-03 NOTE — MR AVS SNAPSHOT
Rigoberto Springer   7/3/2017 3:00 PM   Routine Prenatal   MRN: [id number]    [facility]   Dept Phone:  [phone]    Description:  Female : 1990   Provider:  Tiarra Toney C.N.M.           Allergies as of 7/3/2017     Allergen Noted Reactions    Nkda [No Known Drug Allergy] 2010         You were diagnosed with     Supervision of normal first pregnancy in second trimester   [4924825]         Vital Signs     Blood Pressure Weight Last Menstrual Period Smoking Status          102/60 mmHg 53.524 kg (118 lb) 2017 Current Every Day Smoker        Basic Information     Date Of Birth Sex Race Ethnicity Preferred Language    1990 Female White Non- English      Your appointments     2017 11:15 AM   OB Follow Up with Tiarra Toney C.N.M.   [facility] [address]  [facility] [id number] [phone]              Problem List              ICD-10-CM Priority Class Noted - Resolved    Bipolar affective (CMS-HCC) F31.9   2009 - Present    acne Z30.09   2009 - Present    Preventative health care Z00.00   2009 - Present    Supervision of normal first pregnancy in second trimester Z34.02 Medium  2017 - Present    HSV-1 (herpes simplex virus 1) infection B00.9 Medium  2017 - Present    Mild persistent asthma without complication J45.30 Medium  2017 - Present      Health Maintenance        Date Due Completion Dates    IMM VARICELLA (CHICKENPOX) VACCINE (1 of 2 - 2 Dose Adolescent Series) 2005 ---    IMM PNEUMOCOCCAL 19-64 (ADULT) MEDIUM RISK SERIES (1 of 1 - PPSV23) 2011 ---    IMM INFLUENZA (1) 2017 ---    IMM DTaP/Tdap/Td Vaccine (6 - Td) 3/18/2018 3/18/2008, 7/10/1997, 1993, 1992, 1992    PAP SMEAR 2020, 2015, 2014            Current Immunizations     Dtap Vaccine 7/10/1997, 1993, 1992, 1992    HPV Quadrivalent Vaccine (GARDASIL)  8/17/2009, 12/1/2008, 1/9/2008    Hepatitis A Vaccine, Ped/Adol 12/1/2008, 3/18/2008    Hepatitis B Vaccine Non-Recombivax (Ped/Adol) 3/17/1998, 8/29/1997, 7/10/1997    IPV 7/10/1997, 4/1/1993, 1992, 1992    MMR Vaccine 7/17/1999, 7/10/1997    Tdap Vaccine 3/18/2008    Tuberculin Skin Test 1/15/2013      Below and/or attached are the medications your provider expects you to take. Review all of your home medications and newly ordered medications with your provider and/or pharmacist. Follow medication instructions as directed by your provider and/or pharmacist. Please keep your medication list with you and share with your provider. Update the information when medications are discontinued, doses are changed, or new medications (including over-the-counter products) are added; and carry medication information at all times in the event of emergency situations     Allergies:  NKDA - (reactions not documented)               Medications  Valid as of: July 03, 2017 -  3:35 PM    Generic Name Brand Name Tablet Size Instructions for use    Albuterol Sulfate (Aero Soln) albuterol 108 (90 BASE) MCG/ACT Inhale 4-6 Puffs by mouth every four hours as needed for Shortness of Breath.        Drospirenone-Ethinyl Estradiol (Tab) ALANNA 3-0.03 MG Take 1 Tab by mouth every day.        Drospirenone-Ethinyl Estradiol (Tab) ALANNA 3-0.03 MG Take 1 Tab by mouth every day.        Hydrocodone-Acetaminophen (Tab) NORCO 5-325 MG Take 1-2 Tabs by mouth every 6 hours as needed.        Phenazopyridine HCl (Tab) PYRIDIUM 200 MG Take 1 Tab by mouth 3 times a day as needed.        Prenatal MV-Min-Fe Fum-FA-DHA   Take  by mouth.        .                 Medicines prescribed today were sent to:     [facility] [place] [place] - [address]    [address] [place] [address]    Phone: [phone] Fax: [phone]    Open 24 Hours?: No      Medication refill instructions:       If your prescription bottle indicates you have  medication refills left, it is not necessary to call your provider’s office. Please contact your pharmacy and they will refill your medication.    If your prescription bottle indicates you do not have any refills left, you may request refills at any time through one of the following ways: The online Nitro PDF system (except Urgent Care), by calling your provider’s office, or by asking your pharmacy to contact your provider’s office with a refill request. Medication refills are processed only during regular business hours and may not be available until the next business day. Your provider may request additional information or to have a follow-up visit with you prior to refilling your medication.   *Please Note: Medication refills are assigned a new Rx number when refilled electronically. Your pharmacy may indicate that no refills were authorized even though a new prescription for the same medication is available at the pharmacy. Please request the medicine by name with the pharmacy before contacting your provider for a refill.        Other Notes About Your Plan     PNL WNL, AFP WNL           MyChart Access Code: Activation code not generated  Current Nitro PDF Status: Active          Quit Tobacco Information     Do you want to quit using tobacco?    Quitting tobacco decreases risks of cancer, heart and lung disease, increases life expectancy, improves sense of taste and smell, and increases spending money, among other benefits.    If you are thinking about quitting, we can help.  • [facility] Quit Tobacco Program: [phone]  o Program occurs weekly for four weeks and includes pharmacist consultation on products to support quitting smoking or chewing tobacco. A provider referral is needed for pharmacist consultation.  • Tobacco Users Help Hotline: 1-775-QUIT-NOW (699-5796) or https://[place].quitlogix.org/  o Free, confidential telephone and online coaching for [place] residents. Sessions are designed on a schedule that is  convenient for you. Eligible clients receive free nicotine replacement therapy.  • Nationally: www.smokefree.gov  o Information and professional assistance to support both immediate and long-term needs as you become, and remain, a non-smoker. Smokefree.gov allows you to choose the help that best fits your needs.

## 2017-07-03 NOTE — PROGRESS NOTES
S) Pt is a 27 y.o.   at 21w6d  gestation. Routine prenatal care today. No complaints today. Discussed weight gain as she is concerned. Discussed importance of healthy diet and daily exercise.    Fetal movement Normal  Cramping no,  VB no  LOF no   Denies dysuria. Generally feels well today. Good self-care activities identified. Denies headaches, swelling, visual changes, or epigastric pain .     O) Blood pressure 102/60, weight 53.524 kg (118 lb), last menstrual period 2017.        Labs:       PNL: WNL       GCT: Not yet       AFP: Normal       GBS: N/A       Pertinent ultrasound -        17- Survey WNL, YASMEEN 17, c/w prev dating    A) IUP at 21w6d       S=D         Patient Active Problem List    Diagnosis Date Noted   • Supervision of normal first pregnancy in second trimester 2017     Priority: Medium   • HSV-1 (herpes simplex virus 1) infection 2017     Priority: Medium   • Mild persistent asthma without complication 2017     Priority: Medium   • Bipolar affective (CMS-[facility]) 2009   • acne 2009   • Preventative health care 2009                 TDAP: not yet       BTL: no       : n/a    P) s/s ptl vs general discomforts. Fetal movements reviewed. General ed and anticipatory guidance. Nutrition/exercise/vitamin. Plans breast Plans pp contraception- unsure  Continue PNV.

## 2017-07-31 ENCOUNTER — HOSPITAL ENCOUNTER (OUTPATIENT)
Dept: LAB | Facility: MEDICAL CENTER | Age: 27
End: 2017-07-31
Attending: NURSE PRACTITIONER
Payer: MEDICAID

## 2017-07-31 ENCOUNTER — ROUTINE PRENATAL (OUTPATIENT)
Dept: OBGYN | Facility: CLINIC | Age: 27
End: 2017-07-31
Payer: MEDICAID

## 2017-07-31 VITALS — DIASTOLIC BLOOD PRESSURE: 60 MMHG | BODY MASS INDEX: 21.62 KG/M2 | SYSTOLIC BLOOD PRESSURE: 108 MMHG | WEIGHT: 122 LBS

## 2017-07-31 DIAGNOSIS — Z34.02 SUPERVISION OF NORMAL FIRST PREGNANCY IN SECOND TRIMESTER: Primary | ICD-10-CM

## 2017-07-31 DIAGNOSIS — Z34.02 SUPERVISION OF NORMAL FIRST PREGNANCY IN SECOND TRIMESTER: ICD-10-CM

## 2017-07-31 LAB
GLUCOSE 1H P 50 G GLC PO SERPL-MCNC: 110 MG/DL (ref 70–139)
HCT VFR BLD AUTO: 39.3 % (ref 37–47)
HGB BLD-MCNC: 12.8 G/DL (ref 12–16)
TREPONEMA PALLIDUM IGG+IGM AB [PRESENCE] IN SERUM OR PLASMA BY IMMUNOASSAY: NON REACTIVE

## 2017-07-31 PROCEDURE — 36415 COLL VENOUS BLD VENIPUNCTURE: CPT

## 2017-07-31 PROCEDURE — 85018 HEMOGLOBIN: CPT

## 2017-07-31 PROCEDURE — 86780 TREPONEMA PALLIDUM: CPT

## 2017-07-31 PROCEDURE — 82950 GLUCOSE TEST: CPT

## 2017-07-31 PROCEDURE — 90040 PR PRENATAL FOLLOW UP: CPT | Performed by: NURSE PRACTITIONER

## 2017-07-31 PROCEDURE — 85014 HEMATOCRIT: CPT

## 2017-07-31 NOTE — PROGRESS NOTES
Pt here today for OB follow up  Reports +FM  WT: 122 lb  BP: 108/60  1 hr gtt, H/H, and T.Pallidum lab slip given today with instructions.   Pt states had a lillte bit of bleeding after having sex this weekend. Also having a rash with itching on her right leg.  David # [phone]

## 2017-07-31 NOTE — MR AVS SNAPSHOT
Rigoberto Springer   2017 8:30 AM   Routine Prenatal   MRN: [id number]    Department[facility]   Dept Phone:  [phone]    Description:  Female : 1990   Provider:  Tiarra Toney C.N.M.           Allergies as of 2017     Allergen Noted Reactions    Nkda [No Known Drug Allergy] 2010         You were diagnosed with     Supervision of normal first pregnancy in second trimester   [[id number]]  -  Primary       Vital Signs     Blood Pressure Weight Last Menstrual Period Smoking Status          108/60 mmHg 55.339 kg (122 lb) 2017 Current Every Day Smoker        Basic Information     Date Of Birth Sex Race Ethnicity Preferred Language    1990 Female White Non- English      Problem List              ICD-10-CM Priority Class Noted - Resolved    Bipolar affective (CMS-HCC) F31.9 Medium  2009 - Present    acne Z30.09 Medium  2009 - Present    Preventative health care Z00.00   2009 - Present    Supervision of normal first pregnancy in second trimester Z34.02 Medium  2017 - Present    HSV-1 (herpes simplex virus 1) infection B00.9 Medium  2017 - Present    Mild persistent asthma without complication J45.30 Medium  2017 - Present      Health Maintenance        Date Due Completion Dates    IMM VARICELLA (CHICKENPOX) VACCINE (1 of 2 - 2 Dose Adolescent Series) 2005 ---    IMM PNEUMOCOCCAL 19-64 (ADULT) MEDIUM RISK SERIES (1 of 1 - PPSV23) 2011 ---    IMM INFLUENZA (1) 2017 ---    IMM DTaP/Tdap/Td Vaccine (6 - Td) 3/18/2018 3/18/2008, 7/10/1997, 1993, 1992, 1992    PAP SMEAR 2020, 2015, 2014            Current Immunizations     Dtap Vaccine 7/10/1997, 1993, 1992, 1992    HPV Quadrivalent Vaccine (GARDASIL) 2009, 2008, 2008    Hepatitis A Vaccine, Ped/Adol 2008, 3/18/2008    Hepatitis B Vaccine Non-Recombivax (Ped/Adol) 3/17/1998, 1997, 7/10/1997    IPV  7/10/1997, 4/1/1993, 1992, 1992    MMR Vaccine 7/17/1999, 7/10/1997    Tdap Vaccine 3/18/2008    Tuberculin Skin Test 1/15/2013      Below and/or attached are the medications your provider expects you to take. Review all of your home medications and newly ordered medications with your provider and/or pharmacist. Follow medication instructions as directed by your provider and/or pharmacist. Please keep your medication list with you and share with your provider. Update the information when medications are discontinued, doses are changed, or new medications (including over-the-counter products) are added; and carry medication information at all times in the event of emergency situations     Allergies:  NKDA - (reactions not documented)               Medications  Valid as of: July 31, 2017 - 10:38 AM    Generic Name Brand Name Tablet Size Instructions for use    Albuterol Sulfate (Aero Soln) albuterol 108 (90 BASE) MCG/ACT Inhale 4-6 Puffs by mouth every four hours as needed for Shortness of Breath.        Drospirenone-Ethinyl Estradiol (Tab) ALANNA 3-0.03 MG Take 1 Tab by mouth every day.        Drospirenone-Ethinyl Estradiol (Tab) ALANNA 3-0.03 MG Take 1 Tab by mouth every day.        Hydrocodone-Acetaminophen (Tab) NORCO 5-325 MG Take 1-2 Tabs by mouth every 6 hours as needed.        Phenazopyridine HCl (Tab) PYRIDIUM 200 MG Take 1 Tab by mouth 3 times a day as needed.        Prenatal MV-Min-Fe Fum-FA-DHA   Take  by mouth.        .                 Medicines prescribed today were sent to:     [facility] [place] - [address]    [address] [place] [address]    Phone: [phone] Fax: [phone]    Open 24 Hours?: No      Medication refill instructions:       If your prescription bottle indicates you have medication refills left, it is not necessary to call your provider’s office. Please contact your pharmacy and they will refill your medication.    If your prescription  bottle indicates you do not have any refills left, you may request refills at any time through one of the following ways: The online Slidebeant system (except Urgent Care), by calling your provider’s office, or by asking your pharmacy to contact your provider’s office with a refill request. Medication refills are processed only during regular business hours and may not be available until the next business day. Your provider may request additional information or to have a follow-up visit with you prior to refilling your medication.   *Please Note: Medication refills are assigned a new Rx number when refilled electronically. Your pharmacy may indicate that no refills were authorized even though a new prescription for the same medication is available at the pharmacy. Please request the medicine by name with the pharmacy before contacting your provider for a refill.        Your To Do List     Future Labs/Procedures Complete By Expires    GLUCOSE 1HR GESTATIONAL  As directed 8/1/2018    HCT  As directed 8/1/2018    HGB  As directed 8/1/2018    T.PALLIDUM AB EIA  As directed 8/1/2018      Other Notes About Your Plan     PNL WNL, AFP WNL           MyChart Access Code: Activation code not generated  Current Rapp IT Uphart Status: Active          Quit Tobacco Information     Do you want to quit using tobacco?    Quitting tobacco decreases risks of cancer, heart and lung disease, increases life expectancy, improves sense of taste and smell, and increases spending money, among other benefits.    If you are thinking about quitting, we can help.  • [facility] Quit Tobacco Program: [phone]  o Program occurs weekly for four weeks and includes pharmacist consultation on products to support quitting smoking or chewing tobacco. A provider referral is needed for pharmacist consultation.  • Tobacco Users Help Hotline: 6-969-QUIT-NOW (539-6871) or https://[place].quitlogix.org/  o Free, confidential telephone and online coaching for [place]  residents. Sessions are designed on a schedule that is convenient for you. Eligible clients receive free nicotine replacement therapy.  • Nationally: www.smokefree.gov  o Information and professional assistance to support both immediate and long-term needs as you become, and remain, a non-smoker. Smokefree.gov allows you to choose the help that best fits your needs.

## 2017-07-31 NOTE — PROGRESS NOTES
S) Pt is a 27 y.o.   at 25w6d  gestation. Routine prenatal care today. Complains of a rash/hives on the back of her right calf. Cortisone cream helps. Denies any new lotions, soaps, or clothing. Will trial benadryl to see if this helps. No breaks in the skin or bug bites noted. Also said she has had some spotting after intercourse. Discussed normalcy of spotting and warning signs to watch out for.   Fetal movement Normal  Cramping no,  VB no  LOF no   Denies dysuria. Generally feels well today. Good self-care activities identified. Denies headaches, swelling, visual changes, or epigastric pain .     O) Blood pressure 108/60, weight 55.339 kg (122 lb), last menstrual period 2017.        Labs:       PNL: WNL       GCT: Going today       AFP: Normal       GBS: N/A       Pertinent ultrasound -        17- Survey WNL, YASMEEN 17cm, c/w prev dating    A) IUP at 25w6d       S=D         Patient Active Problem List    Diagnosis Date Noted   • Supervision of normal first pregnancy in second trimester 2017     Priority: Medium   • HSV-1 (herpes simplex virus 1) infection 2017     Priority: Medium   • Mild persistent asthma without complication 2017     Priority: Medium   • Bipolar affective (CMS-[facility]) 2009     Priority: Medium   • acne 2009     Priority: Medium   • Preventative health care 2009                 TDAP: no       BTL: no       : n/a    P) s/s ptl vs general discomforts. Fetal movements reviewed. General ed and anticipatory guidance. Nutrition/exercise/vitamin. Plans breast Plans pp contraception- unsure  Continue PNV.

## 2017-08-07 ENCOUNTER — TELEPHONE (OUTPATIENT)
Dept: OBGYN | Facility: CLINIC | Age: 27
End: 2017-08-07

## 2017-08-07 NOTE — TELEPHONE ENCOUNTER
Pt calling c/o a rash that is turning in hives. Pt has tried Cortisone cream and Benadryl , but states that its not helping. Consulted with midwife Tiarra and informed the pt that she recommends for her to be seen at urgent care or her primary care provider due to her rash getting worse. Pt verbalized understanding and states no other concerns.

## 2017-08-14 ENCOUNTER — TELEPHONE (OUTPATIENT)
Dept: OBGYN | Facility: CLINIC | Age: 27
End: 2017-08-14

## 2017-08-14 NOTE — TELEPHONE ENCOUNTER
Pt called Triage line left message regarding a rush. Reviewed pt's chart and pt spoke to Rebecca QUINTANA on 8/7/17 and per NILESH Toney CNM pt was advised to f/u with PCP or ER/Urgent Care. Called pt back pt states her rush is not getting better and called [employer] to see which urgent care took her medicaid but there is no urgent care that will take her medicaid. Consulted with MONTY Hutchison CNM and again pt is advised to f/u with PCP since she was already seen by us and rash is not improving, provider advised pt f/u with PCP and they can refer her to dermatologist if necessary. Per Zohra QUINTANA looked up on [employer] and [facility] and [facility] both take [employer] and they both take walk-ins. Pt notified. Pt states [facility] is where she has her PCP and she was told they can not see given her an appt until 2-3wks. Explained to pt she should do a walk-in. Pt agreed and verbalized understanding.

## 2017-08-16 ENCOUNTER — OFFICE VISIT (OUTPATIENT)
Dept: URGENT CARE | Facility: CLINIC | Age: 27
End: 2017-08-16

## 2017-08-16 VITALS
HEIGHT: 63 IN | OXYGEN SATURATION: 96 % | SYSTOLIC BLOOD PRESSURE: 102 MMHG | DIASTOLIC BLOOD PRESSURE: 68 MMHG | TEMPERATURE: 98.4 F | WEIGHT: 122 LBS | BODY MASS INDEX: 21.62 KG/M2 | RESPIRATION RATE: 14 BRPM | HEART RATE: 99 BPM

## 2017-08-16 DIAGNOSIS — Z3A.28 28 WEEKS GESTATION OF PREGNANCY: ICD-10-CM

## 2017-08-16 DIAGNOSIS — L20.9 ATOPIC DERMATITIS, UNSPECIFIED TYPE: ICD-10-CM

## 2017-08-16 PROCEDURE — 99214 OFFICE O/P EST MOD 30 MIN: CPT | Performed by: PHYSICIAN ASSISTANT

## 2017-08-16 RX ORDER — TRIAMCINOLONE ACETONIDE OINTMENT USP, 0.05% 0.5 MG/G
1 OINTMENT TOPICAL 2 TIMES DAILY
Qty: 10 G | Refills: 1 | Status: SHIPPED | OUTPATIENT
Start: 2017-08-16 | End: 2017-08-16 | Stop reason: RX

## 2017-08-16 RX ORDER — TRIAMCINOLONE ACETONIDE 0.25 MG/G
1 CREAM TOPICAL 2 TIMES DAILY
Qty: 1 TUBE | Refills: 1 | OUTPATIENT
Start: 2017-08-16 | End: 2017-09-12

## 2017-08-16 ASSESSMENT — ENCOUNTER SYMPTOMS
FEVER: 0
CHILLS: 0
ABDOMINAL PAIN: 0
DIARRHEA: 0
RESPIRATORY NEGATIVE: 1
VOMITING: 0
NAUSEA: 0
CARDIOVASCULAR NEGATIVE: 1

## 2017-08-16 NOTE — MR AVS SNAPSHOT
"        Rigoberto Springer   2017 4:55 PM   Office Visit   MRN: [id number]    Department:  [facility]   Dept Phone:  [phone]    Description:  Female : 1990   Provider:  Addison Singer PA-C           Reason for Visit     Rash           Allergies as of 2017     Allergen Noted Reactions    Nkda [No Known Drug Allergy] 2010         You were diagnosed with     Atopic dermatitis, unspecified type   [[id number]]       28 weeks gestation of pregnancy   [141562]         Vital Signs     Blood Pressure Pulse Temperature Respirations Height Weight    102/68 mmHg 99 36.9 °C (98.4 °F) 14 1.6 m (5' 2.99\") 55.339 kg (122 lb)    Body Mass Index Oxygen Saturation Last Menstrual Period Smoking Status          21.62 kg/m2 96% 2017 Former Smoker        Basic Information     Date Of Birth Sex Race Ethnicity Preferred Language    1990 Female White Non- English      Your appointments     Aug 29, 2017  4:00 PM   OB Follow Up with NILESH MartinNESTELA   [facility] [address] [address]   [phone]              Problem List              ICD-10-CM Priority Class Noted - Resolved    Bipolar affective (CMS-HCC) F31.9 Medium  2009 - Present    acne Z30.09 Medium  2009 - Present    Preventative health care Z00.00   2009 - Present    Supervision of normal first pregnancy in second trimester Z34.02 Medium  2017 - Present    HSV-1 (herpes simplex virus 1) infection B00.9 Medium  2017 - Present    Mild persistent asthma without complication J45.30 Medium  2017 - Present      Health Maintenance        Date Due Completion Dates    IMM VARICELLA (CHICKENPOX) VACCINE (1 of 2 - 2 Dose Adolescent Series) 2005 ---    IMM PNEUMOCOCCAL 19-64 (ADULT) MEDIUM RISK SERIES (1 of 1 - PPSV23) 2011 ---    IMM INFLUENZA (1) 2017 ---    IMM DTaP/Tdap/Td Vaccine (6 - Td) 3/18/2018 3/18/2008, 7/10/1997, 1993, 1992, " 1992    PAP SMEAR 5/9/2020 5/9/2017, 9/30/2015, 7/9/2014            Current Immunizations     Dtap Vaccine 7/10/1997, 4/1/1993, 1992, 1992    HPV Quadrivalent Vaccine (GARDASIL) 8/17/2009, 12/1/2008, 1/9/2008    Hepatitis A Vaccine, Ped/Adol 12/1/2008, 3/18/2008    Hepatitis B Vaccine Non-Recombivax (Ped/Adol) 3/17/1998, 8/29/1997, 7/10/1997    IPV 7/10/1997, 4/1/1993, 1992, 1992    MMR Vaccine 7/17/1999, 7/10/1997    Tdap Vaccine 3/18/2008    Tuberculin Skin Test 1/15/2013      Below and/or attached are the medications your provider expects you to take. Review all of your home medications and newly ordered medications with your provider and/or pharmacist. Follow medication instructions as directed by your provider and/or pharmacist. Please keep your medication list with you and share with your provider. Update the information when medications are discontinued, doses are changed, or new medications (including over-the-counter products) are added; and carry medication information at all times in the event of emergency situations     Allergies:  NKDA - (reactions not documented)               Medications  Valid as of: August 16, 2017 -  6:56 PM    Generic Name Brand Name Tablet Size Instructions for use    Albuterol Sulfate (Aero Soln) albuterol 108 (90 BASE) MCG/ACT Inhale 4-6 Puffs by mouth every four hours as needed for Shortness of Breath.        Drospirenone-Ethinyl Estradiol (Tab) ALANNA 3-0.03 MG Take 1 Tab by mouth every day.        Drospirenone-Ethinyl Estradiol (Tab) ALANNA 3-0.03 MG Take 1 Tab by mouth every day.        Hydrocodone-Acetaminophen (Tab) NORCO 5-325 MG Take 1-2 Tabs by mouth every 6 hours as needed.        Phenazopyridine HCl (Tab) PYRIDIUM 200 MG Take 1 Tab by mouth 3 times a day as needed.        Prenatal MV-Min-Fe Fum-FA-DHA   Take  by mouth.        Triamcinolone Acetonide (Cream) KENALOG 0.025 % Apply 1 Application to affected area(s) 2 times a day.        .                  Medicines prescribed today were sent to:     [facility] - [place], [place] - [address]    [address] [place] [address]    Phone: [phone] Fax: [phone]    Open 24 Hours?: No      Medication refill instructions:       If your prescription bottle indicates you have medication refills left, it is not necessary to call your provider’s office. Please contact your pharmacy and they will refill your medication.    If your prescription bottle indicates you do not have any refills left, you may request refills at any time through one of the following ways: The online BioConsortia system (except Urgent Care), by calling your provider’s office, or by asking your pharmacy to contact your provider’s office with a refill request. Medication refills are processed only during regular business hours and may not be available until the next business day. Your provider may request additional information or to have a follow-up visit with you prior to refilling your medication.   *Please Note: Medication refills are assigned a new Rx number when refilled electronically. Your pharmacy may indicate that no refills were authorized even though a new prescription for the same medication is available at the pharmacy. Please request the medicine by name with the pharmacy before contacting your provider for a refill.        Other Notes About Your Plan     PNL WNL, AFP WNL           MyChart Access Code: Activation code not generated  Current BioConsortia Status: Active

## 2017-08-19 ENCOUNTER — TELEPHONE (OUTPATIENT)
Dept: URGENT CARE | Facility: CLINIC | Age: 27
End: 2017-08-19

## 2017-08-19 NOTE — TELEPHONE ENCOUNTER
1. Caller Name: Rigoberto                                         Call Back Number: [phone]      Patient approves a detailed voicemail message: N\A    Patient called here at [facility] stating you seen her on 8/16/17 for a rash and the kenalog cream isn't helping. States the rash is actually getting worse and its spreading everywhere. She's wondering if you could send another medication to her pharmacy. Please advise, thank you.

## 2017-08-22 RX ORDER — PREDNISONE 20 MG/1
TABLET ORAL
Qty: 5 TAB | Refills: 0 | Status: SHIPPED | OUTPATIENT
Start: 2017-08-22 | End: 2017-09-12

## 2017-08-29 ENCOUNTER — ROUTINE PRENATAL (OUTPATIENT)
Dept: OBGYN | Facility: CLINIC | Age: 27
End: 2017-08-29

## 2017-08-29 VITALS — DIASTOLIC BLOOD PRESSURE: 60 MMHG | SYSTOLIC BLOOD PRESSURE: 102 MMHG | BODY MASS INDEX: 21.97 KG/M2 | WEIGHT: 124 LBS

## 2017-08-29 DIAGNOSIS — Z34.02 SUPERVISION OF NORMAL FIRST PREGNANCY IN SECOND TRIMESTER: Primary | ICD-10-CM

## 2017-08-29 PROCEDURE — 90040 PR PRENATAL FOLLOW UP: CPT | Performed by: NURSE PRACTITIONER

## 2017-08-29 NOTE — LETTER
"Count Your Baby's Movements  Another step to a healthy delivery  Gian Boyd              Dept: [phone]    How Many Weeks Pregnant? 30 w    Date to Begin Countin2017              How to use this chart    One way for your physician to keep track of your baby's health is by knowing how often the baby moves (or \"kicks\") in your womb.  You can help your physician to do this by using this chart every day.    Every day, you should see how many hours it takes for your baby to move 10 times.  Start in the morning, as soon as you get up.    · First, write down the time your baby moves until you get to 10.  · Check off one box every time your baby moves until you get to 10.  · Write down the time you finished counting in the last column.  · Total how long it took to count up all 10 movements.  · Finally, fill in the box that shows how long this took.  After counting 10 movements, you no longer have to count any more that day.  The next morning, just start counting again as soon as you get up.    What should you call a \"movement\"?  It is hard to say, because it will feel different from one mother to another and from one pregnancy to the next.  The important thing is that you count the movements the same way throughout your pregnancy.  If you have more questions, you should ask your physician.    Count carefully every day!  SAMPLE:  Week 28    How many hours did it take to feel 10 movements?       Start  Time     1     2     3     4     5     6     7     8     9     10   Finish Time   Mon 8:20 ·  ·  ·  ·  ·  ·  ·  ·  ·  ·  11:40                  Sat               Sun                 IMPORTANT: You should contact your physician if it takes more than two hours for you to feel 10 movements.  Each morning, write down the time and start to count the movements of your baby.  Keep track by checking off one box every time you feel one movement.  When you have " "felt 10 \"kicks\", write down the time you finished counting in the last column.  Then fill in the   box (over the check miladys) for the number of hours it took.  Be sure to read the complete instructions on the previous page.            "

## 2017-08-29 NOTE — PROGRESS NOTES
S) Pt is a 27 y.o.   at 30w0d  gestation. Routine prenatal care today. Still having itching and rash. Has tried hydrocortisone cream, aveeno, changed lotions, soaps, shampoo. Was seen at urgent care, prescribed Kenalog cream with no relief. Went back to urgent care, was given short course of oral prednisone, but stopped after 3 days because it was causing too much stomach upset and diarrhea. The only thing that has helped her with the itching is CBD oil (cannibus). Also is having some right sided groin pain. Comfort measures.    Fetal movement Normal  Cramping no,  VB no  LOF no   Denies dysuria. Generally feels well today. Good self-care activities identified. Denies headaches, swelling, visual changes, or epigastric pain .     O) Blood pressure 102/60, weight 56.2 kg (124 lb), last menstrual period 2017.        Labs:       PNL: WNL       GCT: 110       AFP: Normal       GBS: N/A       Pertinent ultrasound -        17- Survey WNL, YASMEEN 17.0cm, c/w prev dating    A) IUP at 30w0d       S=D         Patient Active Problem List    Diagnosis Date Noted   • Supervision of normal first pregnancy in second trimester 2017     Priority: Medium   • HSV-1 (herpes simplex virus 1) infection 2017     Priority: Medium   • Mild persistent asthma without complication 2017     Priority: Medium   • Bipolar affective (CMS-[facility]) 2009     Priority: Medium   • acne 2009     Priority: Medium   • Preventative health care 2009                 TDAP: yes       BTL: no       : n/a    P) s/s ptl vs general discomforts. Fetal movements reviewed. General ed and anticipatory guidance. Nutrition/exercise/vitamin. Plans breast Plans pp contraception- unsure  Continue PNV.

## 2017-08-29 NOTE — LETTER
"Count Your Baby's Movements  Another step to a healthy delivery    [facility] [employer] [facility]             Dept: [phone]    How Many Weeks Pregnant? Unknown    Date to Begin Counting: ***              How to use this chart    One way for your physician to keep track of your baby's health is by knowing how often the baby moves (or \"kicks\") in your womb.  You can help your physician to do this by using this chart every day.    Every day, you should see how many hours it takes for your baby to move 10 times.  Start in the morning, as soon as you get up.    · First, write down the time your baby moves until you get to 10.  · Check off one box every time your baby moves until you get to 10.  · Write down the time you finished counting in the last column.  · Total how long it took to count up all 10 movements.  · Finally, fill in the box that shows how long this took.  After counting 10 movements, you no longer have to count any more that day.  The next morning, just start counting again as soon as you get up.    What should you call a \"movement\"?  It is hard to say, because it will feel different from one mother to another and from one pregnancy to the next.  The important thing is that you count the movements the same way throughout your pregnancy.  If you have more questions, you should ask your physician.    Count carefully every day!  SAMPLE:  Week 28    How many hours did it take to feel 10 movements?       Start  Time     1     2     3     4     5     6     7     8     9     10   Finish Time   Mon 8:20 ·  ·  ·  ·  ·  ·  ·  ·  ·  ·  11:40   Tue Wed Thu Fri               Sat               Sun                 IMPORTANT: You should contact your physician if it takes more than two hours for you to feel 10 movements.  Each morning, write down the time and start to count the movements of your baby.  Keep track by checking off one box every time you feel one movement.  When you have " "felt 10 \"kicks\", write down the time you finished counting in the last column.  Then fill in the   box (over the check miladys) for the number of hours it took.  Be sure to read the complete instructions on the previous page.            "

## 2017-08-29 NOTE — PROGRESS NOTES
Ob f/u. + fetal movement   No VB, LOF or contractions   C/O sharp pain right leg  Started today 08/29/2017  Phone number #   Pharmacy verified with patient  WT=  124 lbs           WV=848/60  tdap offered today   Tdap vaccine given. 08/29/2017 Right  Deltoid. VIS given and screening check list reviewed with pt.    Kick count given with instructions

## 2017-08-30 PROCEDURE — 90715 TDAP VACCINE 7 YRS/> IM: CPT | Performed by: NURSE PRACTITIONER

## 2017-08-30 PROCEDURE — 90471 IMMUNIZATION ADMIN: CPT | Performed by: NURSE PRACTITIONER

## 2017-09-12 ENCOUNTER — ROUTINE PRENATAL (OUTPATIENT)
Dept: OBGYN | Facility: CLINIC | Age: 27
End: 2017-09-12
Payer: MEDICAID

## 2017-09-12 VITALS — SYSTOLIC BLOOD PRESSURE: 110 MMHG | WEIGHT: 128 LBS | BODY MASS INDEX: 22.68 KG/M2 | DIASTOLIC BLOOD PRESSURE: 64 MMHG

## 2017-09-12 DIAGNOSIS — L29.9 GENERALIZED PRURITUS: ICD-10-CM

## 2017-09-12 DIAGNOSIS — Z34.02 SUPERVISION OF NORMAL FIRST PREGNANCY IN SECOND TRIMESTER: Primary | ICD-10-CM

## 2017-09-12 PROCEDURE — 90040 PR PRENATAL FOLLOW UP: CPT | Performed by: NURSE PRACTITIONER

## 2017-09-12 NOTE — PROGRESS NOTES
S:  Pt is  at 32w0d for routine OB follow up.  Reports continued itching of her whole body - nothing really has helped.  Reports good FM.  Denies VB, LOF, RUCs or vaginal DC.    O:  Please see above vitals.        FHTs: 152        Fundal ht: 32 cm.    A:  IUP at 32w0d  Patient Active Problem List    Diagnosis Date Noted   • Supervision of normal first pregnancy in second trimester 2017     Priority: Medium   • HSV-1 (herpes simplex virus 1) infection 2017     Priority: Medium   • Mild persistent asthma without complication 2017     Priority: Medium   • Bipolar affective (CMS-HCC) 2009     Priority: Medium   • acne 2009     Priority: Medium   • Preventative health care 2009        P:  1.  GBS @ 35 wks.          2.  Continue FKCs.          3.  Questions answered.          4.  Encouraged pt to tour L&D.          5.  Encourage adequate water intake.        6.  F/u 1 wks.        7.  FYI: none.          8.  Bile acids ordered.

## 2017-09-12 NOTE — PATIENT INSTRUCTIONS
P:  1.  GBS @ 35 wks.          2.  Continue FKCs.          3.  Questions answered.          4.  Encouraged pt to tour L&D.          5.  Encourage adequate water intake.        6.  F/u 1 wks.        7.  FYI: none.          8.  Bile acids ordered.

## 2017-09-12 NOTE — PROGRESS NOTES
Pt here today for OB follow up  Reports +FM  Declines Flu shot  C/o feet swelling and itching all over her body except hands feet and head  Good # [phone]

## 2017-09-15 ENCOUNTER — HOSPITAL ENCOUNTER (OUTPATIENT)
Dept: LAB | Facility: MEDICAL CENTER | Age: 27
End: 2017-09-15
Attending: NURSE PRACTITIONER
Payer: MEDICAID

## 2017-09-15 DIAGNOSIS — L29.9 GENERALIZED PRURITUS: ICD-10-CM

## 2017-09-15 DIAGNOSIS — Z34.02 SUPERVISION OF NORMAL FIRST PREGNANCY IN SECOND TRIMESTER: ICD-10-CM

## 2017-09-15 PROCEDURE — 36415 COLL VENOUS BLD VENIPUNCTURE: CPT

## 2017-09-15 PROCEDURE — 82239 BILE ACIDS TOTAL: CPT

## 2017-09-17 LAB — BILE AC SERPL-SCNC: 5 UMOL/L (ref 0–10)

## 2017-09-20 ENCOUNTER — ROUTINE PRENATAL (OUTPATIENT)
Dept: OBGYN | Facility: CLINIC | Age: 27
End: 2017-09-20
Payer: MEDICAID

## 2017-09-20 VITALS — DIASTOLIC BLOOD PRESSURE: 66 MMHG | WEIGHT: 126.8 LBS | BODY MASS INDEX: 22.47 KG/M2 | SYSTOLIC BLOOD PRESSURE: 112 MMHG

## 2017-09-20 DIAGNOSIS — Z34.02 SUPERVISION OF NORMAL FIRST PREGNANCY IN SECOND TRIMESTER: Primary | ICD-10-CM

## 2017-09-20 PROCEDURE — 90040 PR PRENATAL FOLLOW UP: CPT | Performed by: NURSE PRACTITIONER

## 2017-09-20 NOTE — PROGRESS NOTES
Pt. Here for OB/FU today. Reports Good FM.   Good # [phone]  Pt states having mild period cramping, lower back pain, and some nausea.   Pharmacy verified.   Pt states had Flu vaccine done in 5/2017

## 2017-09-20 NOTE — PROGRESS NOTES
S:  Pt is  at 33w1d for routine OB follow up.  No c/o.  Reports good FM.  Denies VB, LOF, RUCs or vaginal DC.    O:  Please see above vitals.        FHTs: 146        Fundal ht: 33 cm.    A:  IUP at 33w1d  Patient Active Problem List    Diagnosis Date Noted   • Supervision of normal first pregnancy in second trimester 2017     Priority: Medium   • HSV-1 (herpes simplex virus 1) infection 2017     Priority: Medium   • Mild persistent asthma without complication 2017     Priority: Medium   • Bipolar affective (CMS-[facility]) 2009     Priority: Medium   • acne 2009     Priority: Medium   • Preventative health care 2009        P:  1.  GBS @ 35 wks.          2.  Continue FKCs.          3.  Questions answered.          4.  Encouraged pt to tour L&D.          5.  Encourage adequate water intake.        6.  F/u 2 wks.        7.  Lab results reviewed w pt.

## 2017-09-20 NOTE — PATIENT INSTRUCTIONS
P:  1.  GBS @ 35 wks.          2.  Continue FKCs.          3.  Questions answered.          4.  Encouraged pt to tour L&D.          5.  Encourage adequate water intake.        6.  F/u 2 wks.        7.  Lab results reviewed w pt.

## 2017-10-04 ENCOUNTER — ROUTINE PRENATAL (OUTPATIENT)
Dept: OBGYN | Facility: CLINIC | Age: 27
End: 2017-10-04
Payer: MEDICAID

## 2017-10-04 ENCOUNTER — HOSPITAL ENCOUNTER (OUTPATIENT)
Facility: MEDICAL CENTER | Age: 27
End: 2017-10-04
Attending: NURSE PRACTITIONER
Payer: MEDICAID

## 2017-10-04 VITALS — BODY MASS INDEX: 22.5 KG/M2 | WEIGHT: 127 LBS | DIASTOLIC BLOOD PRESSURE: 60 MMHG | SYSTOLIC BLOOD PRESSURE: 102 MMHG

## 2017-10-04 DIAGNOSIS — Z34.02 SUPERVISION OF NORMAL FIRST PREGNANCY IN SECOND TRIMESTER: Primary | ICD-10-CM

## 2017-10-04 DIAGNOSIS — Z34.02 SUPERVISION OF NORMAL FIRST PREGNANCY IN SECOND TRIMESTER: ICD-10-CM

## 2017-10-04 PROCEDURE — 87653 STREP B DNA AMP PROBE: CPT

## 2017-10-04 PROCEDURE — 90040 PR PRENATAL FOLLOW UP: CPT | Performed by: NURSE PRACTITIONER

## 2017-10-04 NOTE — PROGRESS NOTES
Ob f/u. + fetal movement baby is moving ok   No VB, LOF or contractions   C/O  No complaints today   Phone number # [phone]  Pharmacy verified with patient  WT= 127 lbs             FU=376/60  GBS today

## 2017-10-04 NOTE — PATIENT INSTRUCTIONS
P:  1.  GBS obtained.          2.  Labor precautions given.  Instructions given on where to go.  Pt receptive to              education.          3.  Questions answered.          4.  D/w helps for constipation, handout given.        5.  Continue FKCs.          6.  Encouraged adequate water intake        7.  F/u 1 wk.        8.  FYI:  none.

## 2017-10-04 NOTE — PROGRESS NOTES
S:  Pt is  at 35w1d here for routine OB follow up.  Reports some rectal pressure/constipation.  Reports good FM.  Denies VB, LOF, RUCs, or vaginal DC.     O:  Please see above vitals.        FHTs: 160        Fundal ht: 34 cm.        Fetal position: vertex.    A:  IUP at 35w1d  Patient Active Problem List    Diagnosis Date Noted   • Supervision of normal first pregnancy in second trimester 2017     Priority: Medium   • HSV-1 (herpes simplex virus 1) infection 2017     Priority: Medium   • Mild persistent asthma without complication 2017     Priority: Medium   • Bipolar affective (CMS-[facility]) 2009     Priority: Medium   • acne 2009     Priority: Medium   • Preventative health care 2009       P:  1.  GBS obtained.          2.  Labor precautions given.  Instructions given on where to go.  Pt receptive to              education.          3.  Questions answered.          4.  D/w helps for constipation, handout given.        5.  Continue FKCs.          6.  Encouraged adequate water intake        7.  F/u 1 wk.        8.  FYI:  none.

## 2017-10-05 LAB — GP B STREP DNA SPEC QL NAA+PROBE: POSITIVE

## 2017-10-06 PROBLEM — O99.820 GBS (GROUP B STREPTOCOCCUS CARRIER), +RV CULTURE, CURRENTLY PREGNANT: Status: ACTIVE | Noted: 2017-10-06

## 2017-10-11 ENCOUNTER — ROUTINE PRENATAL (OUTPATIENT)
Dept: OBGYN | Facility: CLINIC | Age: 27
End: 2017-10-11
Payer: MEDICAID

## 2017-10-11 VITALS — BODY MASS INDEX: 23.03 KG/M2 | WEIGHT: 130 LBS | DIASTOLIC BLOOD PRESSURE: 60 MMHG | SYSTOLIC BLOOD PRESSURE: 120 MMHG

## 2017-10-11 DIAGNOSIS — O99.820 GBS (GROUP B STREPTOCOCCUS CARRIER), +RV CULTURE, CURRENTLY PREGNANT: ICD-10-CM

## 2017-10-11 DIAGNOSIS — Z34.02 SUPERVISION OF NORMAL FIRST PREGNANCY IN SECOND TRIMESTER: ICD-10-CM

## 2017-10-11 PROCEDURE — 90040 PR PRENATAL FOLLOW UP: CPT | Performed by: NURSE PRACTITIONER

## 2017-10-11 NOTE — PROGRESS NOTES
SUBJECTIVE:  Pt is a 27 y.o.   at 36w1d  gestation. Presents today for follow-up prenatal care. Reports no issues at this time.  Reports good fetal movement. Denies cramping/contractions, bleeding or leaking of fluid. Denies dysuria, headaches, N/V, or other issues at this time. Generally feels well today. Reports no lesions/abnormalities on the vagina. Will be RSVPing for a tour of LnD this week.     OBJECTIVE:  - See prenatal vitals flow  Vitals:    10/11/17 0855   BP: 120/60   Weight: 59 kg (130 lb)      - Pertinent Labs: GBS positive, pt educated on what this means and plan moving forward       ASSESSMENT:   - IUP at 36w1d by LMP which is consistent with 8 week US   - S=D  Patient Active Problem List    Diagnosis Date Noted   • Supervision of normal first pregnancy in second trimester 2017     Priority: Medium   • HSV-1 (herpes simplex virus 1) infection 2017     Priority: Medium   • Mild persistent asthma without complication 2017     Priority: Medium   • Bipolar affective (CMS-HCC) 2009     Priority: Medium   • acne 2009     Priority: Medium   • GBS (group B Streptococcus carrier) 10/06/2017   • Preventative health care 2009         PLAN:  - S/sx pregnancy and labor warning signs vs general discomforts discussed  - Fetal movements and kick counts reviewed   - Adequate hydration reinforced  - Nutrition/exercise/vitamin education: continued PNV  - Plans for breastfeeding: handout given and reviewed   - Plans pills for contraception PP  - S/p TDAP   - Declines Flu   - Anticipatory guidance given  - RTC in 1 week for follow-up prenatal care

## 2017-10-11 NOTE — PROGRESS NOTES
OB f/u. + fetal movement.  No VB, LOF or UC's.  Wt: 130lb      BP: 120/60  Good phone # [phone]  Preferred pharmacy confirmed.  GBS positive

## 2017-10-16 ENCOUNTER — TELEPHONE (OUTPATIENT)
Dept: OBGYN | Facility: CLINIC | Age: 27
End: 2017-10-16

## 2017-10-16 NOTE — TELEPHONE ENCOUNTER
Returning patient's call. Patient c/o irregular UC's since Friday. Patient states they can be very strong. But they go away. Baby is moving good. Reassurance given to patient. S/s of labor and emergencies were reviewed with patient.

## 2017-10-18 ENCOUNTER — HOSPITAL ENCOUNTER (INPATIENT)
Facility: MEDICAL CENTER | Age: 27
LOS: 2 days | End: 2017-10-20
Attending: OBSTETRICS & GYNECOLOGY | Admitting: OBSTETRICS & GYNECOLOGY
Payer: MEDICAID

## 2017-10-18 LAB
BASOPHILS # BLD AUTO: 0.6 % (ref 0–1.8)
BASOPHILS # BLD: 0.08 K/UL (ref 0–0.12)
EOSINOPHIL # BLD AUTO: 0.34 K/UL (ref 0–0.51)
EOSINOPHIL NFR BLD: 2.4 % (ref 0–6.9)
ERYTHROCYTE [DISTWIDTH] IN BLOOD BY AUTOMATED COUNT: 47 FL (ref 35.9–50)
ERYTHROCYTE [DISTWIDTH] IN BLOOD BY AUTOMATED COUNT: 48.2 FL (ref 35.9–50)
HCT VFR BLD AUTO: 36.4 % (ref 37–47)
HCT VFR BLD AUTO: 39.3 % (ref 37–47)
HGB BLD-MCNC: 12.4 G/DL (ref 12–16)
HGB BLD-MCNC: 13.6 G/DL (ref 12–16)
HOLDING TUBE BB 8507: NORMAL
IMM GRANULOCYTES # BLD AUTO: 0.08 K/UL (ref 0–0.11)
IMM GRANULOCYTES NFR BLD AUTO: 0.6 % (ref 0–0.9)
LYMPHOCYTES # BLD AUTO: 3.74 K/UL (ref 1–4.8)
LYMPHOCYTES NFR BLD: 26.4 % (ref 22–41)
MCH RBC QN AUTO: 31.5 PG (ref 27–33)
MCH RBC QN AUTO: 31.6 PG (ref 27–33)
MCHC RBC AUTO-ENTMCNC: 34.1 G/DL (ref 33.6–35)
MCHC RBC AUTO-ENTMCNC: 34.6 G/DL (ref 33.6–35)
MCV RBC AUTO: 91 FL (ref 81.4–97.8)
MCV RBC AUTO: 92.9 FL (ref 81.4–97.8)
MONOCYTES # BLD AUTO: 0.81 K/UL (ref 0–0.85)
MONOCYTES NFR BLD AUTO: 5.7 % (ref 0–13.4)
NEUTROPHILS # BLD AUTO: 9.13 K/UL (ref 2–7.15)
NEUTROPHILS NFR BLD: 64.3 % (ref 44–72)
NRBC # BLD AUTO: 0 K/UL
NRBC BLD AUTO-RTO: 0 /100 WBC
PLATELET # BLD AUTO: 223 K/UL (ref 164–446)
PLATELET # BLD AUTO: 285 K/UL (ref 164–446)
PMV BLD AUTO: 9.5 FL (ref 9–12.9)
PMV BLD AUTO: 9.7 FL (ref 9–12.9)
RBC # BLD AUTO: 3.92 M/UL (ref 4.2–5.4)
RBC # BLD AUTO: 4.32 M/UL (ref 4.2–5.4)
WBC # BLD AUTO: 14.2 K/UL (ref 4.8–10.8)
WBC # BLD AUTO: 19.6 K/UL (ref 4.8–10.8)

## 2017-10-18 PROCEDURE — A9270 NON-COVERED ITEM OR SERVICE: HCPCS | Performed by: NURSE PRACTITIONER

## 2017-10-18 PROCEDURE — 700111 HCHG RX REV CODE 636 W/ 250 OVERRIDE (IP)

## 2017-10-18 PROCEDURE — 700102 HCHG RX REV CODE 250 W/ 637 OVERRIDE(OP): Performed by: NURSE PRACTITIONER

## 2017-10-18 PROCEDURE — 700101 HCHG RX REV CODE 250

## 2017-10-18 PROCEDURE — 59409 OBSTETRICAL CARE: CPT

## 2017-10-18 PROCEDURE — 36415 COLL VENOUS BLD VENIPUNCTURE: CPT

## 2017-10-18 PROCEDURE — 700105 HCHG RX REV CODE 258

## 2017-10-18 PROCEDURE — 4A1HX4Z MONITORING OF PRODUCTS OF CONCEPTION, CARDIAC ELECTRICAL ACTIVITY, EXTERNAL APPROACH: ICD-10-PCS | Performed by: OBSTETRICS & GYNECOLOGY

## 2017-10-18 PROCEDURE — 700111 HCHG RX REV CODE 636 W/ 250 OVERRIDE (IP): Performed by: NURSE PRACTITIONER

## 2017-10-18 PROCEDURE — 770002 HCHG ROOM/CARE - OB PRIVATE (112)

## 2017-10-18 PROCEDURE — 85025 COMPLETE CBC W/AUTO DIFF WBC: CPT

## 2017-10-18 PROCEDURE — 0UQMXZZ REPAIR VULVA, EXTERNAL APPROACH: ICD-10-PCS | Performed by: OBSTETRICS & GYNECOLOGY

## 2017-10-18 PROCEDURE — 85027 COMPLETE CBC AUTOMATED: CPT

## 2017-10-18 PROCEDURE — 304965 HCHG RECOVERY SERVICES

## 2017-10-18 RX ORDER — MISOPROSTOL 200 UG/1
800 TABLET ORAL
Status: DISCONTINUED | OUTPATIENT
Start: 2017-10-18 | End: 2017-10-18 | Stop reason: HOSPADM

## 2017-10-18 RX ORDER — MAG HYDROX/ALUMINUM HYD/SIMETH 400-400-40
1 SUSPENSION, ORAL (FINAL DOSE FORM) ORAL
Status: DISCONTINUED | OUTPATIENT
Start: 2017-10-18 | End: 2017-10-20 | Stop reason: HOSPADM

## 2017-10-18 RX ORDER — ACETAMINOPHEN 325 MG/1
325 TABLET ORAL EVERY 4 HOURS PRN
Status: DISCONTINUED | OUTPATIENT
Start: 2017-10-18 | End: 2017-10-20 | Stop reason: HOSPADM

## 2017-10-18 RX ORDER — ROPIVACAINE HYDROCHLORIDE 2 MG/ML
INJECTION, SOLUTION EPIDURAL; INFILTRATION; PERINEURAL
Status: ACTIVE
Start: 2017-10-18 | End: 2017-10-18

## 2017-10-18 RX ORDER — CARBOPROST TROMETHAMINE 250 UG/ML
250 INJECTION, SOLUTION INTRAMUSCULAR
Status: DISCONTINUED | OUTPATIENT
Start: 2017-10-18 | End: 2017-10-20 | Stop reason: HOSPADM

## 2017-10-18 RX ORDER — IBUPROFEN 600 MG/1
600 TABLET ORAL EVERY 6 HOURS PRN
Status: DISCONTINUED | OUTPATIENT
Start: 2017-10-18 | End: 2017-10-20 | Stop reason: HOSPADM

## 2017-10-18 RX ORDER — METHYLERGONOVINE MALEATE 0.2 MG/ML
0.2 INJECTION INTRAVENOUS
Status: DISCONTINUED | OUTPATIENT
Start: 2017-10-18 | End: 2017-10-20 | Stop reason: HOSPADM

## 2017-10-18 RX ORDER — DEXTROSE, SODIUM CHLORIDE, SODIUM LACTATE, POTASSIUM CHLORIDE, AND CALCIUM CHLORIDE 5; .6; .31; .03; .02 G/100ML; G/100ML; G/100ML; G/100ML; G/100ML
INJECTION, SOLUTION INTRAVENOUS CONTINUOUS
Status: DISCONTINUED | OUTPATIENT
Start: 2017-10-18 | End: 2017-10-18 | Stop reason: HOSPADM

## 2017-10-18 RX ORDER — PENICILLIN G POTASSIUM 5000000 [IU]/1
5 INJECTION, POWDER, FOR SOLUTION INTRAMUSCULAR; INTRAVENOUS ONCE
Status: COMPLETED | OUTPATIENT
Start: 2017-10-18 | End: 2017-10-18

## 2017-10-18 RX ORDER — MISOPROSTOL 200 UG/1
600 TABLET ORAL
Status: DISCONTINUED | OUTPATIENT
Start: 2017-10-18 | End: 2017-10-20 | Stop reason: HOSPADM

## 2017-10-18 RX ORDER — SODIUM CHLORIDE, SODIUM LACTATE, POTASSIUM CHLORIDE, CALCIUM CHLORIDE 600; 310; 30; 20 MG/100ML; MG/100ML; MG/100ML; MG/100ML
INJECTION, SOLUTION INTRAVENOUS
Status: COMPLETED
Start: 2017-10-18 | End: 2017-10-18

## 2017-10-18 RX ORDER — BISACODYL 10 MG
10 SUPPOSITORY, RECTAL RECTAL PRN
Status: DISCONTINUED | OUTPATIENT
Start: 2017-10-18 | End: 2017-10-20 | Stop reason: HOSPADM

## 2017-10-18 RX ORDER — SODIUM CHLORIDE, SODIUM LACTATE, POTASSIUM CHLORIDE, CALCIUM CHLORIDE 600; 310; 30; 20 MG/100ML; MG/100ML; MG/100ML; MG/100ML
INJECTION, SOLUTION INTRAVENOUS PRN
Status: DISCONTINUED | OUTPATIENT
Start: 2017-10-18 | End: 2017-10-20 | Stop reason: HOSPADM

## 2017-10-18 RX ORDER — VITAMIN A ACETATE, BETA CAROTENE, ASCORBIC ACID, CHOLECALCIFEROL, .ALPHA.-TOCOPHEROL ACETATE, DL-, THIAMINE MONONITRATE, RIBOFLAVIN, NIACINAMIDE, PYRIDOXINE HYDROCHLORIDE, FOLIC ACID, CYANOCOBALAMIN, CALCIUM CARBONATE, FERROUS FUMARATE, ZINC OXIDE, CUPRIC OXIDE 3080; 12; 120; 400; 1; 1.84; 3; 20; 22; 920; 25; 200; 27; 10; 2 [IU]/1; UG/1; MG/1; [IU]/1; MG/1; MG/1; MG/1; MG/1; MG/1; [IU]/1; MG/1; MG/1; MG/1; MG/1; MG/1
1 TABLET, FILM COATED ORAL EVERY MORNING
Status: DISCONTINUED | OUTPATIENT
Start: 2017-10-18 | End: 2017-10-20 | Stop reason: HOSPADM

## 2017-10-18 RX ORDER — SODIUM CHLORIDE, SODIUM LACTATE, POTASSIUM CHLORIDE, CALCIUM CHLORIDE 600; 310; 30; 20 MG/100ML; MG/100ML; MG/100ML; MG/100ML
INJECTION, SOLUTION INTRAVENOUS CONTINUOUS
Status: DISCONTINUED | OUTPATIENT
Start: 2017-10-18 | End: 2017-10-18 | Stop reason: HOSPADM

## 2017-10-18 RX ORDER — LIDOCAINE HYDROCHLORIDE AND EPINEPHRINE 15; 5 MG/ML; UG/ML
INJECTION, SOLUTION EPIDURAL
Status: ACTIVE
Start: 2017-10-18 | End: 2017-10-18

## 2017-10-18 RX ORDER — OXYCODONE HYDROCHLORIDE AND ACETAMINOPHEN 5; 325 MG/1; MG/1
2 TABLET ORAL EVERY 4 HOURS PRN
Status: DISCONTINUED | OUTPATIENT
Start: 2017-10-18 | End: 2017-10-20 | Stop reason: HOSPADM

## 2017-10-18 RX ORDER — ONDANSETRON 2 MG/ML
4 INJECTION INTRAMUSCULAR; INTRAVENOUS EVERY 6 HOURS PRN
Status: DISCONTINUED | OUTPATIENT
Start: 2017-10-18 | End: 2017-10-20 | Stop reason: HOSPADM

## 2017-10-18 RX ORDER — ALUMINA, MAGNESIA, AND SIMETHICONE 2400; 2400; 240 MG/30ML; MG/30ML; MG/30ML
30 SUSPENSION ORAL EVERY 6 HOURS PRN
Status: DISCONTINUED | OUTPATIENT
Start: 2017-10-18 | End: 2017-10-18 | Stop reason: HOSPADM

## 2017-10-18 RX ORDER — PENICILLIN G POTASSIUM 5000000 [IU]/1
INJECTION, POWDER, FOR SOLUTION INTRAMUSCULAR; INTRAVENOUS
Status: COMPLETED
Start: 2017-10-18 | End: 2017-10-18

## 2017-10-18 RX ORDER — ONDANSETRON 4 MG/1
4 TABLET, ORALLY DISINTEGRATING ORAL EVERY 6 HOURS PRN
Status: DISCONTINUED | OUTPATIENT
Start: 2017-10-18 | End: 2017-10-20 | Stop reason: HOSPADM

## 2017-10-18 RX ORDER — OXYCODONE HYDROCHLORIDE AND ACETAMINOPHEN 5; 325 MG/1; MG/1
1 TABLET ORAL EVERY 4 HOURS PRN
Status: DISCONTINUED | OUTPATIENT
Start: 2017-10-18 | End: 2017-10-20 | Stop reason: HOSPADM

## 2017-10-18 RX ORDER — DOCUSATE SODIUM 100 MG/1
100 CAPSULE, LIQUID FILLED ORAL 2 TIMES DAILY PRN
Status: DISCONTINUED | OUTPATIENT
Start: 2017-10-18 | End: 2017-10-20 | Stop reason: HOSPADM

## 2017-10-18 RX ORDER — LIDOCAINE HYDROCHLORIDE 10 MG/ML
INJECTION, SOLUTION INFILTRATION; PERINEURAL
Status: COMPLETED
Start: 2017-10-18 | End: 2017-10-18

## 2017-10-18 RX ADMIN — OXYCODONE HYDROCHLORIDE AND ACETAMINOPHEN 2 TABLET: 5; 325 TABLET ORAL at 20:46

## 2017-10-18 RX ADMIN — Medication 1 TABLET: at 18:57

## 2017-10-18 RX ADMIN — IBUPROFEN 600 MG: 600 TABLET, FILM COATED ORAL at 14:44

## 2017-10-18 RX ADMIN — LIDOCAINE HYDROCHLORIDE: 10 INJECTION, SOLUTION INFILTRATION; PERINEURAL at 06:17

## 2017-10-18 RX ADMIN — OXYCODONE HYDROCHLORIDE AND ACETAMINOPHEN 2 TABLET: 5; 325 TABLET ORAL at 10:39

## 2017-10-18 RX ADMIN — PENICILLIN G POTASSIUM 5 MILLION UNITS: 5000000 POWDER, FOR SOLUTION INTRAMUSCULAR; INTRAPLEURAL; INTRATHECAL; INTRAVENOUS at 05:25

## 2017-10-18 RX ADMIN — IBUPROFEN 600 MG: 600 TABLET, FILM COATED ORAL at 06:47

## 2017-10-18 RX ADMIN — OXYCODONE HYDROCHLORIDE AND ACETAMINOPHEN 2 TABLET: 5; 325 TABLET ORAL at 14:44

## 2017-10-18 RX ADMIN — OXYCODONE HYDROCHLORIDE AND ACETAMINOPHEN 2 TABLET: 5; 325 TABLET ORAL at 06:47

## 2017-10-18 RX ADMIN — SODIUM CHLORIDE, SODIUM LACTATE, POTASSIUM CHLORIDE, CALCIUM CHLORIDE 1000 ML: 600; 310; 30; 20 INJECTION, SOLUTION INTRAVENOUS at 05:24

## 2017-10-18 RX ADMIN — SODIUM CHLORIDE, POTASSIUM CHLORIDE, SODIUM LACTATE AND CALCIUM CHLORIDE 1000 ML: 600; 310; 30; 20 INJECTION, SOLUTION INTRAVENOUS at 05:24

## 2017-10-18 RX ADMIN — PENICILLIN G POTASSIUM 5 MILLION UNITS: 5000000 INJECTION, POWDER, FOR SOLUTION INTRAMUSCULAR; INTRAVENOUS at 05:25

## 2017-10-18 RX ADMIN — OXYTOCIN 125 ML/HR: 10 INJECTION, SOLUTION INTRAMUSCULAR; INTRAVENOUS at 07:42

## 2017-10-18 RX ADMIN — IBUPROFEN 600 MG: 600 TABLET, FILM COATED ORAL at 20:46

## 2017-10-18 ASSESSMENT — PAIN SCALES - GENERAL
PAINLEVEL_OUTOF10: 2
PAINLEVEL_OUTOF10: 6
PAINLEVEL_OUTOF10: 2
PAINLEVEL_OUTOF10: 7
PAINLEVEL_OUTOF10: 7
PAINLEVEL_OUTOF10: 2
PAINLEVEL_OUTOF10: 7

## 2017-10-18 ASSESSMENT — LIFESTYLE VARIABLES
EVER_SMOKED: YES
DO YOU DRINK ALCOHOL: NO
ALCOHOL_USE: NO
EVER_SMOKED: YES
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
## 2017-10-18 NOTE — CARE PLAN
Problem: Infection  Goal: Will remain free from infection  Discussed with pt. importance of handwashing to prevent the spread of infection prior to people holding the infant, after using the restroom and prior to eating. Pt. verbalized understanding.

## 2017-10-18 NOTE — PROCEDURES
SPONTANEOUS VAGINAL DELIVERY PROCEDURE NOTE    PATIENT ID:  NAME:  Rigoberto Springer  MRN:               [id number]  YOB: 1990    Labor Course: Patient was admitted for SROM and CTXs. PCN 2 grams given for GBS positive status. Progressed rapidly in labor to complete with effective pushing efforts. Patient used cannabis for nausea and pain yesterday.    On 10/18/2017  at 06:07, this 27 y.o., now  37w1d , GBS positive female delivered via  under no anesthesia a viable male infant weighing 5 lbs and 12 oz with APGAR scores of 8 and 9 at one and five minutes. The head delivered in MORENITA with rotation to ROT. There was a single nuchal cord which infant was delivered through. The shoulders and rest of body delivered uneventfully. Infant was placed on mothers abdomen for transition care. Once pulsing ceased, the cord was doubly clamped by myself and cut by FOB. An intact placenta was delivered spontaneously at 06:22 with 3 vessel cord. Upon vaginal exam, there was a Rt labial laceration which was repaired using 4.0 Chromic in the usual sterile fashion. Estimated blood loss was <300cc. Patient will be transferred to postpartum in stable condition and infant to  nursery.      Delivery attended by Kimberly Gonzalez CNM, APRN.

## 2017-10-18 NOTE — PROGRESS NOTES
; EDC ; EGA 37.1    0529 - Report from KELSEY Ervin RN. Pt breathing heavily thru UC's, stating urge to push. Ant lip/0. Pt coached thru [facility] with RN guidance.   0558 - Pt complete/+1. Pushing with RN guidance. SPENCER Gonzalez [facility] called for delivery  0607 -  of viable male infant. 8/9 APGARs. R labial tear, repaired.  0700 - Report to KELSEY Hayes RN

## 2017-10-18 NOTE — CARE PLAN
Problem: Pain Management  Goal: Pain level will decrease to patient's comfort goal  Outcome: PROGRESSING AS EXPECTED  Discussed with pt. Pain scale. Pt.'s pain well controlled after motrin and percocet.

## 2017-10-18 NOTE — PROGRESS NOTES
Patient transferred from labor and delivery to room 334. Patient and family oriented to room and postpartum routine. Assessment done. Patient has no c/o pain,med orders released  Lochia light  Fundus firm. IV site without redness or drainage, 2nd bag pitocin started on pump at 125/hr.Vizsafe system reviewed with patient. Safety and security reviewed baby in room bands and cuddles checked.

## 2017-10-19 PROCEDURE — 700102 HCHG RX REV CODE 250 W/ 637 OVERRIDE(OP): Performed by: NURSE PRACTITIONER

## 2017-10-19 PROCEDURE — 770002 HCHG ROOM/CARE - OB PRIVATE (112)

## 2017-10-19 PROCEDURE — A9270 NON-COVERED ITEM OR SERVICE: HCPCS | Performed by: NURSE PRACTITIONER

## 2017-10-19 RX ADMIN — OXYCODONE HYDROCHLORIDE AND ACETAMINOPHEN 1 TABLET: 5; 325 TABLET ORAL at 21:33

## 2017-10-19 RX ADMIN — IBUPROFEN 600 MG: 600 TABLET, FILM COATED ORAL at 19:10

## 2017-10-19 RX ADMIN — OXYCODONE HYDROCHLORIDE AND ACETAMINOPHEN 2 TABLET: 5; 325 TABLET ORAL at 17:13

## 2017-10-19 RX ADMIN — IBUPROFEN 600 MG: 600 TABLET, FILM COATED ORAL at 05:00

## 2017-10-19 RX ADMIN — IBUPROFEN 600 MG: 600 TABLET, FILM COATED ORAL at 13:05

## 2017-10-19 RX ADMIN — OXYCODONE HYDROCHLORIDE AND ACETAMINOPHEN 2 TABLET: 5; 325 TABLET ORAL at 13:05

## 2017-10-19 RX ADMIN — OXYCODONE HYDROCHLORIDE AND ACETAMINOPHEN 2 TABLET: 5; 325 TABLET ORAL at 05:00

## 2017-10-19 ASSESSMENT — PAIN SCALES - GENERAL
PAINLEVEL_OUTOF10: 7
PAINLEVEL_OUTOF10: 5
PAINLEVEL_OUTOF10: 5
PAINLEVEL_OUTOF10: 2

## 2017-10-19 NOTE — CARE PLAN
Problem: Altered physiologic condition related to immediate post-delivery state and potential for bleeding/hemorrhage  Goal: Patient physiologically stable as evidenced by normal lochia, palpable uterine involution and vital signs within normal limits  Outcome: PROGRESSING AS EXPECTED  Fundus firm, lochia light,blood pressure and other vitals stable. Patient able to ambulate without dizziness. Patient intake of fluids wnl. Post delivery hematocrit and hemoglobin  is stable.    Problem: Potential for postpartum infection related to presence of episiotomy/vaginal tear and/or uterine contamination  Goal: Patient will be absent from signs and symptoms of infection  Outcome: PROGRESSING AS EXPECTED  Patient vitals stable.temp wnl. Patient has no fever or chills. Patient instructed in pericare with vini bottle and q3-4 hour pad changes. Cbc shows no signs of infection.

## 2017-10-19 NOTE — CARE PLAN
Problem: Alteration in comfort related to episiotomy, vaginal repair and/or after birth pains  Goal: Patient is able to ambulate, care for self and infant  Outcome: PROGRESSING AS EXPECTED  Pt up in room caring for self and infant well.  Goal: Patient verbalizes acceptable pain level  Outcome: PROGRESSING AS EXPECTED  Pt states that pain is well controlled with current pain medication.

## 2017-10-19 NOTE — DISCHARGE PLANNING
:    Infant: Dirk Dover (: 10/18/17)    Referral: History of THC and bipolar.    Intervention:  Reviewed medical record and met with MOB, Rigoberto Springer who delivered her first baby.  The FOB is Fran Dover and he is involved.  Parents live together at [address] [place] [id number].  Phone number is [phone].  MOB stated they are prepared for infant and are receiving Medicaid.  Parents both work and MOB is employed at [employer] and FOB works for [employer].      Discussed marijuana use and MOB admits to using to help with nausea.  She used prior to delivery and infant's tox screen is positive for THC.  Explained that a [facility] will [facility] to [facility].  Called CPS and reported information to Hannah Whitfield.  Report is information only.  Mother is aware of the risks of using marijuana while breastfeeding.    Discussed history of bipolar and MOB stated she was mis-diagnosed with having bipolar at age 17.  She denies having any problems and is not on medication.      Provided MOB with a pediatrician list, children's resource list, and a diaper bank referral.  MOB stated she did not need a WIC application.    Plan:  Infant is cleared to discharge home with MOB.

## 2017-10-19 NOTE — PROGRESS NOTES
Assessment completed WDL. Pt denies pain at this time. Plan of care discussed. Pt encouraged to call with needs.

## 2017-10-19 NOTE — PROGRESS NOTES
Medicated for pain with good relief. Ambulates independently with steady gait. Voiding without difficulty. Fundus is firm with light lochia. Bonding well with baby. Breast feeding well, pt aware that marijuana is discouraged while breastfeeding and long term effects are not fully known.

## 2017-10-20 VITALS
HEART RATE: 67 BPM | OXYGEN SATURATION: 99 % | TEMPERATURE: 99.1 F | WEIGHT: 130 LBS | BODY MASS INDEX: 23.04 KG/M2 | SYSTOLIC BLOOD PRESSURE: 126 MMHG | RESPIRATION RATE: 18 BRPM | DIASTOLIC BLOOD PRESSURE: 83 MMHG | HEIGHT: 63 IN

## 2017-10-20 PROCEDURE — 700112 HCHG RX REV CODE 229: Performed by: NURSE PRACTITIONER

## 2017-10-20 PROCEDURE — A9270 NON-COVERED ITEM OR SERVICE: HCPCS | Performed by: NURSE PRACTITIONER

## 2017-10-20 PROCEDURE — 700102 HCHG RX REV CODE 250 W/ 637 OVERRIDE(OP): Performed by: NURSE PRACTITIONER

## 2017-10-20 RX ORDER — IBUPROFEN 600 MG/1
600 TABLET ORAL EVERY 6 HOURS PRN
Qty: 30 TAB | Refills: 0 | Status: SHIPPED | OUTPATIENT
Start: 2017-10-20 | End: 2018-04-27

## 2017-10-20 RX ORDER — PSEUDOEPHEDRINE HCL 30 MG
100 TABLET ORAL 2 TIMES DAILY PRN
Qty: 60 CAP | Refills: 0 | Status: ON HOLD | OUTPATIENT
Start: 2017-10-20 | End: 2018-06-23

## 2017-10-20 RX ORDER — OXYCODONE HYDROCHLORIDE AND ACETAMINOPHEN 5; 325 MG/1; MG/1
1 TABLET ORAL EVERY 4 HOURS PRN
Qty: 15 TAB | Refills: 0 | Status: SHIPPED | OUTPATIENT
Start: 2017-10-20 | End: 2018-04-27

## 2017-10-20 RX ADMIN — DOCUSATE SODIUM 100 MG: 100 CAPSULE ORAL at 08:05

## 2017-10-20 RX ADMIN — OXYCODONE HYDROCHLORIDE AND ACETAMINOPHEN 2 TABLET: 5; 325 TABLET ORAL at 01:28

## 2017-10-20 RX ADMIN — IBUPROFEN 600 MG: 600 TABLET, FILM COATED ORAL at 08:05

## 2017-10-20 RX ADMIN — IBUPROFEN 600 MG: 600 TABLET, FILM COATED ORAL at 01:28

## 2017-10-20 RX ADMIN — Medication 1 TABLET: at 08:05

## 2017-10-20 RX ADMIN — OXYCODONE HYDROCHLORIDE AND ACETAMINOPHEN 1 TABLET: 5; 325 TABLET ORAL at 06:46

## 2017-10-20 ASSESSMENT — PAIN SCALES - GENERAL
PAINLEVEL_OUTOF10: 5
PAINLEVEL_OUTOF10: 6
PAINLEVEL_OUTOF10: 4
PAINLEVEL_OUTOF10: 7
PAINLEVEL_OUTOF10: 3

## 2017-10-20 ASSESSMENT — COPD QUESTIONNAIRES
COPD SCREENING SCORE: 0
HAVE YOU SMOKED AT LEAST 100 CIGARETTES IN YOUR ENTIRE LIFE: NO/DON'T KNOW
DURING THE PAST 4 WEEKS HOW MUCH DID YOU FEEL SHORT OF BREATH: NONE/LITTLE OF THE TIME
DO YOU EVER COUGH UP ANY MUCUS OR PHLEGM?: NO/ONLY WITH OCCASIONAL COLDS OR INFECTIONS

## 2017-10-20 ASSESSMENT — PATIENT HEALTH QUESTIONNAIRE - PHQ9
SUM OF ALL RESPONSES TO PHQ9 QUESTIONS 1 AND 2: 0
SUM OF ALL RESPONSES TO PHQ QUESTIONS 1-9: 0
2. FEELING DOWN, DEPRESSED, IRRITABLE, OR HOPELESS: NOT AT ALL
1. LITTLE INTEREST OR PLEASURE IN DOING THINGS: NOT AT ALL

## 2017-10-20 NOTE — PROGRESS NOTES
Discharge teaching reviewed with patient, all questions answered. Pt given all written information on self and infant care, including prescriptions and follow-up instructions. Pt doing well with infant, and feels comfortable with her feeding plan.  Discharged to home at 1026. Escorted out in wheelchair by staff.

## 2017-10-20 NOTE — CARE PLAN
Problem: Potential for postpartum infection related to presence of episiotomy/vaginal tear and/or uterine contamination  Goal: Patient will be absent from signs and symptoms of infection  Outcome: PROGRESSING AS EXPECTED  Pt's vital signs remain within defined limits.  No signs of infection are present.  Will continue to monitor per hospital policy.

## 2017-10-20 NOTE — CARE PLAN
Problem: Potential knowledge deficit related to lack of understanding of self and  care  Goal: Patient will verbalize understanding of self and infant care  Outcome: PROGRESSING AS EXPECTED  Patient verbalizes understanding self and infant care after discharge instructions have been reviewed.    Problem: Potential anxiety related to difficulty adapting to parental role  Goal: Patient will verbalize and demonstrate effective bonding and parenting behavior  Outcome: PROGRESSING AS EXPECTED  Patient is bonding well with baby and is very attentive to baby's needs.

## 2017-10-20 NOTE — CARE PLAN
Problem: Altered physiologic condition related to immediate post-delivery state and potential for bleeding/hemorrhage  Goal: Patient physiologically stable as evidenced by normal lochia, palpable uterine involution and vital signs within normal limits  Outcome: PROGRESSING AS EXPECTED  Pt's fundus remains firm with scant rubra lochia observed.  No signs of hemorrhage are present at this time.  Will continue to monitor per hospital policy.

## 2017-10-20 NOTE — CONSULTS
Discussed normal course of breastfeeding and what to expect at 12-48 hours. Encouraged frequent skin to skin, and to continue offering breast every 2-3 hours.   Breastfeeding essentials pamphlet given, and reviewed.     EDDIE worked with Alissa GARDNER to get infant latched to right side, but infant continues to refuse. Encouraged to switch sides during nursing to provided equal stimulation to both breasts.     Discussed discharge feeding plan-   1- To breastfeed first.  2- if latch or breastfeeding is suboptimal, can supplement according to guidelines. (Volumes reviewed per infant birthweight)  3. Use breastpump to protect supply.     EDDIE has medicaid, and is unsure if can qualify for [facility].     Discussed where she can get outpatient lactation assistance, at [facility] with CLEs.     EDDIE has no other questions or concerns regarding breastfeeding. Encouraged to call for assistance if needed with latch.

## 2017-10-20 NOTE — DISCHARGE INSTRUCTIONS
POSTPARTUM DISCHARGE INSTRUCTIONS FOR MOM    YOB: 1990   Age: 27 y.o.               Admit Date: 10/18/2017     Discharge Date: 10/20/2017  Attending Doctor:  Dannie Cano M.D.                  Allergies:  Nkda [no known drug allergy]    Discharged to home by car. Discharged via wheelchair, hospital escort: Yes.  Special equipment needed: Not Applicable  Belongings with: Personal  Be sure to schedule a follow-up appointment with your primary care doctor or any specialists as instructed.     Discharge Plan:   Influenza Vaccine Indication: Patient Refuses    REASONS TO CALL YOUR OBSTETRICIAN:  1.   Persistent fever or shaking chills (Temperature higher than 100.4)  2.   Heavy bleeding (soaking more than 1 pad per hour); Passing clots  3.   Foul odor from vagina  4.   Mastitis (Breast infection; breast pain, chills, fever, redness)  5.   Urinary pain, burning or frequency  6.   Episiotomy infection  7.   Abdominal incision infection  8.   Severe depression longer than 24 hours    HAND WASHING  · Prior to handling the baby.  · Before breastfeeding or bottle feeding baby.  · After using the bathroom or changing the baby's diaper.    WOUND CARE  Ask your physician for additional care instructions.  In general:    ·  Incision:      · Keep clean and dry.    · Do NOT lift anything heavier than your baby for up to 6 weeks.    · There should not be any opening or pus.      VAGINAL CARE  · Nothing inside vagina for 6 weeks: no sexual intercourse, tampons or douching.  · Bleeding may continue for 2-4 weeks.  Amount may vary.    · Call your physician for heavy bleeding which means soaking more than 1 pad per hour    BIRTH CONTROL  · It is possible to become pregnant at any time after delivery and while breastfeeding.  · Plan to discuss a method of birth control with your physician at your follow up visit. visit.    DIET AND ELIMINATION  · Eating more fiber (bran cereal, fruits, and vegetables) and drinking  "plenty of fluids will help to avoid constipation.  · Urinary frequency after childbirth is normal.    POSTPARTUM BLUES  During the first few days after birth, you may experience a sense of the \"blues\" which may include impatience, irritability or even crying.  These feeling come and go quickly.  However, as many as 1 in 10 women experience emotional symptoms known as postpartum depression.    Postpartum depression:  May start as early as the second or third day after delivery or take several weeks or months to develop.  Symptoms of \"blues\" are present, but are more intense:  Crying spells; loss of appetite; feelings of hopelessness or loss of control; fear of touching the baby; over concern or no concern at all about the baby; little or no concern about your own appearance/caring for yourself; and/or inability to sleep or excessive sleeping.  Contact your physician if you are experiencing any of these symptoms.    Crisis Hotline:  · [place] Crisis Hotline:  7-411-FRZCYWV  Or 1-947.788.2563  · [place] Crisis Hotline:  1-404.787.1530  Or 790-459-5681    PREVENTING SHAKEN BABY:  If you are angry or stressed, PUT THE BABY IN THE CRIB, step away, take some deep breaths, and wait until you are calm to care for the baby.  DO NOT SHAKE THE BABY.  You are not alone, call a supporter for help.    · Crisis Call Center 24/7 crisis line 715-512-0048 or 1-615.126.8695  · You can also text them, text \"ANSWER\" to 801900    QUIT SMOKING/TOBACCO USE:  I understand the use of any tobacco products increases my chance of suffering from future heart disease and could cause other illnesses which may shorten my life. Quitting the use of tobacco products is the single most important thing I can do to improve my health. For further information on smoking / tobacco cessation call a Toll Free Quit Line at 1-918.696.8695 (*[employer] [place]) or 1-267.266.4780 (American Lung Association) or you can access the web based program at " www.lungusa.org.    · [place] Tobacco Users Help Line:  (100) 516-8459       Toll Free: 1-625.141.6391  · Quit Tobacco Program [facility] (287)523-9877    DEPRESSION / SUICIDE RISK:  As you are discharged from this [facility], it is important to learn how to keep safe from harming yourself.    Recognize the warning signs:  · Abrupt changes in personality, positive or negative- including increase in energy   · Giving away possessions  · Change in eating patterns- significant weight changes-  positive or negative  · Change in sleeping patterns- unable to sleep or sleeping all the time   · Unwillingness or inability to communicate  · Depression  · Unusual sadness, discouragement and loneliness  · Talk of wanting to die  · Neglect of personal appearance   · Rebelliousness- reckless behavior  · Withdrawal from people/activities they love  · Confusion- inability to concentrate     If you or a loved one observes any of these behaviors or has concerns about self-harm, here's what you can do:  · Talk about it- your feelings and reasons for harming yourself  · Remove any means that you might use to hurt yourself (examples: pills, rope, extension cords, firearm)  · Get professional help from the community (Mental Health, Substance Abuse, psychological counseling)  · Do not be alone:Call your Safe Contact- someone whom you trust who will be there for you.  · Call your local CRISIS HOTLINE 214-9710 or 560-080-6172  · Call your local Children's Mobile Crisis Response Team [place] (153) 305-6068 or www.[employer]  · Call the toll free National Suicide Prevention Hotlines   · National Suicide Prevention Lifeline 964-237-RSSH (8259)  · National Hope Line Network 800-SUICIDE (777-7898)    DISCHARGE SURVEY:  Thank you for choosing [facility].  We hope we provided you with very good care.  You may be receiving a survey in the mail.  Please fill it out.  Your opinion is valuable to  us.    ADDITIONAL EDUCATIONAL MATERIALS GIVEN TO PATIENT:  Pelvic rest for 6 weeks   Ambulate   Encourage breastfeeding     My signature on this form indicates that:  1.  I have reviewed and understand the above information  2.  My questions regarding this information have been answered to my satisfaction.  3.  I have formulated a plan with my discharge nurse to obtain my prescribed medication for home.

## 2017-10-20 NOTE — PROGRESS NOTES
Received report from Crystal Reeves RN; Assumed care of pt.    2000- POC discussed with pt and opportunity provided for questions.  Answers given to questions and pt verbalized understanding of information provided to her.  Denies having any further questions at this time.  No signs of distress observed in pt.  FOB at bedside for support.  Will continue to monitor per hospital policy.  Pt requests to receive pain medication on a scheduled basis until she falls asleep.  But, if pain medication is due and pt is sleeping, pt wishes to be allowed to remain asleep and will call for pain medication as needed.
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
## 2017-10-20 NOTE — PROGRESS NOTES
Patient states that she understands all discharge instructions and has no questions at this time.

## 2017-11-21 ENCOUNTER — POST PARTUM (OUTPATIENT)
Dept: OBGYN | Facility: CLINIC | Age: 27
End: 2017-11-21
Payer: MEDICAID

## 2017-11-21 VITALS — DIASTOLIC BLOOD PRESSURE: 68 MMHG | BODY MASS INDEX: 19.84 KG/M2 | SYSTOLIC BLOOD PRESSURE: 100 MMHG | WEIGHT: 112 LBS

## 2017-11-21 DIAGNOSIS — Z34.02 SUPERVISION OF NORMAL FIRST PREGNANCY IN SECOND TRIMESTER: ICD-10-CM

## 2017-11-21 DIAGNOSIS — O99.820 GBS (GROUP B STREPTOCOCCUS CARRIER), +RV CULTURE, CURRENTLY PREGNANT: ICD-10-CM

## 2017-11-21 PROCEDURE — 90050 PR POSTPARTUM VISIT: CPT | Performed by: NURSE PRACTITIONER

## 2017-11-21 RX ORDER — ACETAMINOPHEN AND CODEINE PHOSPHATE 120; 12 MG/5ML; MG/5ML
1 SOLUTION ORAL DAILY
Qty: 28 TAB | Refills: 12 | Status: SHIPPED | OUTPATIENT
Start: 2017-11-21 | End: 2018-04-27

## 2017-11-21 ASSESSMENT — ENCOUNTER SYMPTOMS
EYES NEGATIVE: 1
RESPIRATORY NEGATIVE: 1
CONSTITUTIONAL NEGATIVE: 1
PSYCHIATRIC NEGATIVE: 1
MUSCULOSKELETAL NEGATIVE: 1
GASTROINTESTINAL NEGATIVE: 1
CARDIOVASCULAR NEGATIVE: 1
NEUROLOGICAL NEGATIVE: 1

## 2017-11-21 NOTE — PATIENT INSTRUCTIONS
P  - Micronor rx for interim as breastfeeding supply established; quick start and back up method/missed pill reviewed   - Patient has Rx for Ocella OCPs that her PCP refilled for her  - Resumption of sexual activity: safe sex precautions given  - Counseling on nutrition, adequate hydration, and exercise   - Continue PNV for breastfeeding mother  - Counseling on PAP guidelines re: next PAP due 5/2020  - Warning s/sx of postpartum infection, depression, preeclampsia   - RTC PRN

## 2017-11-21 NOTE — PROGRESS NOTES
Subjective:      Rigoberto Springer is a 27 y.o. female who presents for postpartum exam.           S   28 y/o now  s/p  10/18/17 of baby boy weighing 5lb 12 oz without complications. Right labial laceration. Now 4 weeks postpartum. Prenatal course significant for no issues . Postpartum course without any complications. Feeling well and happy with baby, denies any severe mood swings or s/sx of postpartum depression and anxiety. Baby is doing well, breastfeeding exclusively.  No issues with breastfeeding at this time.  Has not resumed sexual activity.  Mother reports no issues with bowel or bladder routine, continued regular diet. Bleeding since birth is darker brown at this time with no return to menses. No vaginal pain/odor/itching, fever, headaches, dizziness/SOB or dysuria. Desires pills for contraception.       O  See PE: Physical exam today with no abnormal findings  Vital signs WNL: BP and weight  H/H: 19.6>12.4/36.4<223  PAP: NILM on 2017    A  Reassuring exam of lactating postpartum woman s/p  on 10/18/17      P  - Micronor rx for interim as breastfeeding supply established; quick start and back up method/missed pill reviewed   - Patient has Rx for Ocella OCPs that her PCP refilled for her  - Resumption of sexual activity: safe sex precautions given  - Counseling on nutrition, adequate hydration, and exercise   - Continue PNV for breastfeeding mother  - Counseling on PAP guidelines re: next PAP due 2020  - Warning s/sx of postpartum infection, depression, preeclampsia   - RTC PRN             Review of Systems   Constitutional: Negative.    HENT: Negative.    Eyes: Negative.    Respiratory: Negative.    Cardiovascular: Negative.    Gastrointestinal: Negative.    Genitourinary: Negative.    Musculoskeletal: Negative.    Skin: Negative.    Neurological: Negative.    Endo/Heme/Allergies: Negative.    Psychiatric/Behavioral: Negative.           Objective:     /68   Wt 50.8 kg (112 lb)    LMP 01/25/2017   Breastfeeding? Unknown   BMI 19.84 kg/m²      Physical Exam   Constitutional: She is oriented to person, place, and time. She appears well-developed and well-nourished.   HENT:   Head: Normocephalic and atraumatic.   Eyes: Pupils are equal, round, and reactive to light.   Neck: Normal range of motion. Neck supple. No thyromegaly present.   Cardiovascular: Normal rate and regular rhythm.    Pulmonary/Chest: Effort normal and breath sounds normal.   Abdominal: Soft. She exhibits no distension and no mass. There is no tenderness. There is no rebound and no guarding. No hernia.   Genitourinary: Vagina normal and uterus normal.   Genitourinary Comments: Right labial lacc. Fully healed, no sutures in situ   Musculoskeletal: Normal range of motion.   Neurological: She is alert and oriented to person, place, and time.   Skin: Skin is warm and dry.   Psychiatric: She has a normal mood and affect. Her behavior is normal. Judgment and thought content normal.               Assessment/Plan:   1. Normal exam of postpartum exam of lactating woman

## 2018-03-16 ENCOUNTER — NON-PROVIDER VISIT (OUTPATIENT)
Dept: OBGYN | Facility: CLINIC | Age: 28
End: 2018-03-16
Payer: MEDICAID

## 2018-03-16 DIAGNOSIS — Z32.01 PREGNANCY EXAMINATION OR TEST, POSITIVE RESULT: ICD-10-CM

## 2018-03-16 LAB
INT CON NEG: NEGATIVE
INT CON POS: POSITIVE
POC URINE PREGNANCY TEST: NORMAL

## 2018-03-16 PROCEDURE — 81025 URINE PREGNANCY TEST: CPT | Performed by: OBSTETRICS & GYNECOLOGY

## 2018-03-22 ENCOUNTER — APPOINTMENT (OUTPATIENT)
Dept: RADIOLOGY | Facility: MEDICAL CENTER | Age: 28
End: 2018-03-22
Attending: EMERGENCY MEDICINE
Payer: MEDICAID

## 2018-03-22 ENCOUNTER — HOSPITAL ENCOUNTER (EMERGENCY)
Facility: MEDICAL CENTER | Age: 28
End: 2018-03-22
Attending: EMERGENCY MEDICINE
Payer: MEDICAID

## 2018-03-22 VITALS
BODY MASS INDEX: 19.84 KG/M2 | RESPIRATION RATE: 14 BRPM | HEIGHT: 63 IN | HEART RATE: 89 BPM | DIASTOLIC BLOOD PRESSURE: 78 MMHG | WEIGHT: 111.99 LBS | OXYGEN SATURATION: 96 % | SYSTOLIC BLOOD PRESSURE: 127 MMHG | TEMPERATURE: 97.9 F

## 2018-03-22 DIAGNOSIS — Z3A.13 13 WEEKS GESTATION OF PREGNANCY: ICD-10-CM
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
## 2018-03-22 LAB — B-HCG SERPL-ACNC: ABNORMAL MIU/ML (ref 0–5)

## 2018-03-22 PROCEDURE — 700102 HCHG RX REV CODE 250 W/ 637 OVERRIDE(OP): Performed by: EMERGENCY MEDICINE

## 2018-03-22 PROCEDURE — 76817 TRANSVAGINAL US OBSTETRIC: CPT

## 2018-03-22 PROCEDURE — 99284 EMERGENCY DEPT VISIT MOD MDM: CPT

## 2018-03-22 PROCEDURE — 36415 COLL VENOUS BLD VENIPUNCTURE: CPT

## 2018-03-22 PROCEDURE — A9270 NON-COVERED ITEM OR SERVICE: HCPCS | Performed by: EMERGENCY MEDICINE

## 2018-03-22 PROCEDURE — 84702 CHORIONIC GONADOTROPIN TEST: CPT

## 2018-03-22 RX ORDER — ACETAMINOPHEN 325 MG/1
650 TABLET ORAL ONCE
Status: COMPLETED | OUTPATIENT
Start: 2018-03-22 | End: 2018-03-22

## 2018-03-22 RX ADMIN — ACETAMINOPHEN 650 MG: 325 TABLET, FILM COATED ORAL at 17:41

## 2018-03-22 ASSESSMENT — PAIN SCALES - GENERAL: PAINLEVEL_OUTOF10: 7
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
## 2018-03-23 NOTE — DISCHARGE INSTRUCTIONS
You can take Tylenol as needed for pain. Please follow-up pregnancy Center    Second Trimester of Pregnancy  The second trimester is from week 13 through week 28 (months 4 through 6). The second trimester is often a time when you feel your best. Your body has also adjusted to being pregnant, and you begin to feel better physically. Usually, morning sickness has lessened or quit completely, you may have more energy, and you may have an increase in appetite. The second trimester is also a time when the fetus is growing rapidly. At the end of the sixth month, the fetus is about 9 inches long and weighs about 1½ pounds. You will likely begin to feel the baby move (quickening) between 18 and 20 weeks of the pregnancy.  Body changes during your second trimester  Your body continues to go through many changes during your second trimester. The changes vary from woman to woman.  · Your weight will continue to increase. You will notice your lower abdomen bulging out.  · You may begin to get stretch marks on your hips, abdomen, and breasts.  · You may develop headaches that can be relieved by medicines. The medicines should be approved by your health care provider.  · You may urinate more often because the fetus is pressing on your bladder.  · You may develop or continue to have heartburn as a result of your pregnancy.  · You may develop constipation because certain hormones are causing the muscles that push waste through your intestines to slow down.  · You may develop hemorrhoids or swollen, bulging veins (varicose veins).  · You may have back pain. This is caused by:  ¨ Weight gain.  ¨ Pregnancy hormones that are relaxing the joints in your pelvis.  ¨ A shift in weight and the muscles that support your balance.  · Your breasts will continue to grow and they will continue to become tender.  · Your gums may bleed and may be sensitive to brushing and flossing.  · Dark spots or blotches (chloasma, mask of pregnancy) may develop  on your face. This will likely fade after the baby is born.  · A dark line from your belly button to the pubic area (linea nigra) may appear. This will likely fade after the baby is born.  · You may have changes in your hair. These can include thickening of your hair, rapid growth, and changes in texture. Some women also have hair loss during or after pregnancy, or hair that feels dry or thin. Your hair will most likely return to normal after your baby is born.  What to expect at prenatal visits  During a routine prenatal visit:  · You will be weighed to make sure you and the fetus are growing normally.  · Your blood pressure will be taken.  · Your abdomen will be measured to track your baby's growth.  · The fetal heartbeat will be listened to.  · Any test results from the previous visit will be discussed.  Your health care provider may ask you:  · How you are feeling.  · If you are feeling the baby move.  · If you have had any abnormal symptoms, such as leaking fluid, bleeding, severe headaches, or abdominal cramping.  · If you are using any tobacco products, including cigarettes, chewing tobacco, and electronic cigarettes.  · If you have any questions.  Other tests that may be performed during your second trimester include:  · Blood tests that check for:  ¨ Low iron levels (anemia).  ¨ Gestational diabetes (between 24 and 28 weeks).  ¨ Rh antibodies. This is to check for a protein on red blood cells (Rh factor).  · Urine tests to check for infections, diabetes, or protein in the urine.  · An ultrasound to confirm the proper growth and development of the baby.  · An amniocentesis to check for possible genetic problems.  · Fetal screens for spina bifida and Down syndrome.  · HIV (human immunodeficiency virus) testing. Routine prenatal testing includes screening for HIV, unless you choose not to have this test.  Follow these instructions at home:  Eating and drinking  · Continue to eat regular, healthy  meals.  · Avoid raw meat, uncooked cheese, cat litter boxes, and soil used by cats. These carry germs that can cause birth defects in the baby.  · Take your prenatal vitamins.  · Take 5816-1340 mg of calcium daily starting at the 20th week of pregnancy until you deliver your baby.  · If you develop constipation:  ¨ Take over-the-counter or prescription medicines.  ¨ Drink enough fluid to keep your urine clear or pale yellow.  ¨ Eat foods that are high in fiber, such as fresh fruits and vegetables, whole grains, and beans.  ¨ Limit foods that are high in fat and processed sugars, such as fried and sweet foods.  Activity  · Exercise only as directed by your health care provider. Experiencing uterine cramps is a good sign to stop exercising.  · Avoid heavy lifting, wear low heel shoes, and practice good posture.  · Wear your seat belt at all times when driving.  · Rest with your legs elevated if you have leg cramps or low back pain.  · Wear a good support bra for breast tenderness.  · Do not use hot tubs, steam rooms, or saunas.  Lifestyle  · Avoid all smoking, herbs, alcohol, and unprescribed drugs. These chemicals affect the formation and growth of the baby.  · Do not use any products that contain nicotine or tobacco, such as cigarettes and e-cigarettes. If you need help quitting, ask your health care provider.  · A sexual relationship may be continued unless your health care provider directs you otherwise.  General instructions  · Follow your health care provider's instructions regarding medicine use. There are medicines that are either safe or unsafe to take during pregnancy.  · Take warm sitz baths to soothe any pain or discomfort caused by hemorrhoids. Use hemorrhoid cream if your health care provider approves.  · If you develop varicose veins, wear support hose. Elevate your feet for 15 minutes, 3-4 times a day. Limit salt in your diet.  · Visit your dentist if you have not gone yet during your pregnancy. Use a  soft toothbrush to brush your teeth and be gentle when you floss.  · Keep all follow-up prenatal visits as told by your health care provider. This is important.  Contact a health care provider if:  · You have dizziness.  · You have mild pelvic cramps, pelvic pressure, or nagging pain in the abdominal area.  · You have persistent nausea, vomiting, or diarrhea.  · You have a bad smelling vaginal discharge.  · You have pain with urination.  Get help right away if:  · You have a fever.  · You are leaking fluid from your vagina.  · You have spotting or bleeding from your vagina.  · You have severe abdominal cramping or pain.  · You have rapid weight gain or weight loss.  · You have shortness of breath with chest pain.  · You notice sudden or extreme swelling of your face, hands, ankles, feet, or legs.  · You have not felt your baby move in over an hour.  · You have severe headaches that do not go away with medicine.  · You have vision changes.  Summary  · The second trimester is from week 13 through week 28 (months 4 through 6). It is also a time when the fetus is growing rapidly.  · Your body goes through many changes during pregnancy. The changes vary from woman to woman.  · Avoid all smoking, herbs, alcohol, and unprescribed drugs. These chemicals affect the formation and growth your baby.  · Do not use any tobacco products, such as cigarettes, chewing tobacco, and e-cigarettes. If you need help quitting, ask your health care provider.  · Contact your health care provider if you have any questions. Keep all prenatal visits as told by your health care provider. This is important.  This information is not intended to replace advice given to you by your health care provider. Make sure you discuss any questions you have with your health care provider.  Document Released: 12/12/2002 Document Revised: 05/25/2017 Document Reviewed: 02/18/2014  [employer] Interactive Patient Education © 2017 [employer]    General  Assault  Introduction  Assault includes any behavior or physical attack--whether it is on purpose or not--that results in injury to another person, damage to property, or both. This also includes assault that has not yet happened, but is planned to happen. Threats of assault may be physical, verbal, or written. They may be said or sent by:  · Mail.  · E-mail.  · Text.  · Social media.  · Fax.  The threats may be direct, implied, or understood.  What are the different forms of assault?  Forms of assault include:  · Physically assaulting a person. This includes physical threats to inflict physical harm as well as:  ¨ Slapping.  ¨ Hitting.  ¨ Poking.  ¨ Kicking.  ¨ Punching.  ¨ Pushing.  · Sexually assaulting a person. Sexual assault is any sexual activity that a person is forced, threatened, or coerced to participate in. It may or may not involve physical contact with the person who is assaulting you. You are sexually assaulted if you are forced to have sexual contact of any kind.  · Damaging or destroying a person’s assistive equipment, such as glasses, canes, or walkers.  · Throwing or hitting objects.  · Using or displaying a weapon to harm or threaten someone.  · Using or displaying an object that appears to be a weapon in a threatening manner.  · Using greater physical size or strength to intimidate someone.  · Making intimidating or threatening gestures.  · Bullying.  · Hazing.  · Using language that is intimidating, threatening, hostile, or abusive.  · Stalking.  · Restraining someone with force.  What should I do if I experience assault?    · Report assaults, threats, and stalking to the police. Call your local emergency services (911 in the [place] if you are in immediate danger or you need medical help.  · You can work with a  or an advocate to get legal protection against someone who has assaulted you or threatened you with assault. Protection includes restraining orders and private addresses. Crimes  against you, such as assault, can also be prosecuted through the courts. Laws will vary depending on where you live.  This information is not intended to replace advice given to you by your health care provider. Make sure you discuss any questions you have with your health care provider.  Document Released: 12/18/2006 Document Revised: 05/25/2017 Document Reviewed: 09/04/2015  © 2017 [employer]vier

## 2018-03-23 NOTE — ED NOTES
Pt verbalized understanding of discharge and follow up instructions.  VSS.  All questions answered, ambulates with steady gait to discharge.
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
## 2018-03-26 ENCOUNTER — TELEPHONE (OUTPATIENT)
Dept: OBGYN | Facility: CLINIC | Age: 28
End: 2018-03-26

## 2018-03-26 NOTE — TELEPHONE ENCOUNTER
Pt called triage line stating she was ED this weekend and had a transvaginal U/S done and found she was 13w1d pregnant. I called pt to let her know I was going to transfer her to our PAR to get a NOB scheduled. Pt had no further questions.

## 2018-04-16 ENCOUNTER — OFFICE VISIT (OUTPATIENT)
Dept: URGENT CARE | Facility: CLINIC | Age: 28
End: 2018-04-16
Payer: COMMERCIAL

## 2018-04-16 VITALS
DIASTOLIC BLOOD PRESSURE: 62 MMHG | HEIGHT: 63 IN | OXYGEN SATURATION: 96 % | BODY MASS INDEX: 21.09 KG/M2 | HEART RATE: 64 BPM | SYSTOLIC BLOOD PRESSURE: 98 MMHG | RESPIRATION RATE: 14 BRPM | TEMPERATURE: 97.5 F | WEIGHT: 119 LBS

## 2018-04-16 DIAGNOSIS — J04.0 LARYNGITIS: ICD-10-CM

## 2018-04-16 PROCEDURE — 99214 OFFICE O/P EST MOD 30 MIN: CPT | Performed by: PHYSICIAN ASSISTANT

## 2018-04-16 ASSESSMENT — ENCOUNTER SYMPTOMS
EYE REDNESS: 0
SPUTUM PRODUCTION: 0
HEADACHES: 0
FEVER: 0
EYE DISCHARGE: 0
COUGH: 0
SHORTNESS OF BREATH: 0
CHILLS: 0
EYE PAIN: 0
WHEEZING: 0
PALPITATIONS: 0
HEMOPTYSIS: 0
STRIDOR: 0
SORE THROAT: 1

## 2018-04-16 NOTE — PATIENT INSTRUCTIONS
Laryngitis  Introduction  Laryngitis is inflammation of your vocal cords. This causes hoarseness, coughing, loss of voice, sore throat, or a dry throat. Your vocal cords are two bands of muscles that are found in your throat. When you speak, these cords come together and vibrate. These vibrations come out through your mouth as sound. When your vocal cords are inflamed, your voice sounds different.  Laryngitis can be temporary (acute) or long-term (chronic). Most cases of acute laryngitis improve with time. Chronic laryngitis is laryngitis that lasts for more than three weeks.  What are the causes?  Acute laryngitis may be caused by:  · A viral infection.  · Lots of talking, yelling, or singing. This is also called vocal strain.  · Bacterial infections.  Chronic laryngitis may be caused by:  · Vocal strain.  · Injury to your vocal cords.  · Acid reflux (gastroesophageal reflux disease or GERD).  · Allergies.  · Sinus infection.  · Smoking.  · Alcohol abuse.  · Breathing in chemicals or dust.  · Growths on the vocal cords.  What increases the risk?  Risk factors for laryngitis include:  · Smoking.  · Alcohol abuse.  · Having allergies.  What are the signs or symptoms?  Symptoms of laryngitis may include:  · Low, hoarse voice.  · Loss of voice.  · Dry cough.  · Sore throat.  · Stuffy nose.  How is this diagnosed?  Laryngitis may be diagnosed by:  · Physical exam.  · Throat culture.  · Blood test.  · Laryngoscopy. This procedure allows your health care provider to look at your vocal cords with a mirror or viewing tube.  How is this treated?  Treatment for laryngitis depends on what is causing it. Usually, treatment involves resting your voice and using medicines to soothe your throat. However, if your laryngitis is caused by a bacterial infection, you may need to take antibiotic medicine. If your laryngitis is caused by a growth, you may need to have a procedure to remove it.  Follow these instructions at home:  · Drink  enough fluid to keep your urine clear or pale yellow.  · Breathe in moist air. Use a humidifier if you live in a dry climate.  · Take medicines only as directed by your health care provider.  · If you were prescribed an antibiotic medicine, finish it all even if you start to feel better.  · Do not smoke cigarettes or electronic cigarettes. If you need help quitting, ask your health care provider.  · Talk as little as possible. Also avoid whispering, which can cause vocal strain.  · Write instead of talking. Do this until your voice is back to normal.  Contact a health care provider if:  · You have a fever.  · You have increasing pain.  · You have difficulty swallowing.  Get help right away if:  · You cough up blood.  · You have trouble breathing.  This information is not intended to replace advice given to you by your health care provider. Make sure you discuss any questions you have with your health care provider.  Document Released: 12/18/2006 Document Revised: 05/25/2017 Document Reviewed: 06/02/2015  © 2017 [employer]

## 2018-04-16 NOTE — LETTER
April 16, 2018         Patient: Rigoberto Springer   YOB: 1990   Date of Visit: 4/16/2018           To Whom it May Concern:    Rigoberto Springer was seen in my clinic on 4/16/2018. Please excuse her from work 4/16-4/18/2018  If you have any questions or concerns, please don't hesitate to call.        Sincerely,           Go Guzmán P.A.-C.  Electronically Signed

## 2018-04-16 NOTE — PROGRESS NOTES
Subjective:      Rigoberto Springer is a 27 y.o. female who presents with Laryngitis (x2-3 weeks)            Pharyngitis    This is a new problem. Episode onset: 3 weeks ago. The problem has been unchanged. Associated symptoms include congestion. Pertinent negatives include no coughing, ear discharge, ear pain, headaches, shortness of breath or stridor. Associated symptoms comments: Hoarse voice  . She has tried gargles for the symptoms. The treatment provided no relief.       Review of Systems   Constitutional: Negative for chills, fever and malaise/fatigue.   HENT: Positive for congestion and sore throat. Negative for ear discharge and ear pain.    Eyes: Negative for pain, discharge and redness.   Respiratory: Negative for cough, hemoptysis, sputum production, shortness of breath, wheezing and stridor.    Cardiovascular: Negative for chest pain and palpitations.   Skin: Negative for itching and rash.   Neurological: Negative for headaches.   All other systems reviewed and are negative.    PMH:  has a past medical history of acne (8/17/2009); Anxiety (2010); ASTHMA; Bipolar affective (CMS-[facility]) (8/17/2009); Depression (2010); Kidney disease; Menstrual irregularity (8/17/2009); Migraine; Substance abuse; and Urinary tract infection, site not specified.  MEDS:   Current Outpatient Prescriptions:   •  norethindrone (MICRONOR) 0.35 MG tablet, Take 1 Tab by mouth every day., Disp: 28 Tab, Rfl: 12  •  oxycodone-acetaminophen (PERCOCET) 5-325 MG Tab, Take 1 Tab by mouth every four hours as needed (for Moderate Pain (Pain Scale 4-6) after delivery)., Disp: 15 Tab, Rfl: 0  •  ibuprofen (MOTRIN) 600 MG Tab, Take 1 Tab by mouth every 6 hours as needed (For cramping after delivery; do not give if patient is receiving ketorolac (Toradol))., Disp: 30 Tab, Rfl: 0  •  docusate sodium 100 MG Cap, Take 100 mg by mouth 2 times a day as needed for Constipation., Disp: 60 Cap, Rfl: 0  •  NS SOLN 60 mL with albuterol 2.5 mg/0.5 mL  "NEBU 5 mL, 5 mg/hr by Nebulization route., Disp: , Rfl:   •  Prenatal MV-Min-Fe Fum-FA-DHA (PRENATAL 1 PO), Take  by mouth., Disp: , Rfl:   ALLERGIES:   Allergies   Allergen Reactions   • Nkda [No Known Drug Allergy]      SURGHX:   Past Surgical History:   Procedure Laterality Date   • DENTAL SURGERY      Bison teeth.      SOCHX:  reports that she quit smoking about 13 months ago. Her smoking use included Cigarettes. She has a 3.00 pack-year smoking history. She has never used smokeless tobacco. She reports that she uses drugs, including Marijuana and Inhaled. She reports that she does not drink alcohol.  FH: Family history was reviewed, no pertinent findings to report  Medications, Allergies, and current problem list reviewed today in Epic       Objective:     BP (!) 98/62   Pulse 64   Temp 36.4 °C (97.5 °F)   Resp 14   Ht 1.6 m (5' 3\")   Wt 54 kg (119 lb)   SpO2 96%   BMI 21.08 kg/m²      Physical Exam   Constitutional: She is oriented to person, place, and time. She appears well-developed and well-nourished. She is active.  Non-toxic appearance. She does not have a sickly appearance. She does not appear ill. No distress. She is not intubated.   HENT:   Head: Normocephalic and atraumatic.   Right Ear: Hearing, tympanic membrane, external ear and ear canal normal.   Left Ear: Hearing, tympanic membrane, external ear and ear canal normal.   Nose: Nose normal.   Mouth/Throat: Uvula is midline, oropharynx is clear and moist and mucous membranes are normal.   Eyes: Conjunctivae, EOM and lids are normal.   Neck: Normal range of motion. Neck supple.   Cardiovascular: Regular rhythm, S1 normal, S2 normal and normal heart sounds.  Exam reveals no gallop and no friction rub.    No murmur heard.  Pulmonary/Chest: Effort normal and breath sounds normal. No accessory muscle usage. No apnea, no tachypnea and no bradypnea. She is not intubated. No respiratory distress. She has no decreased breath sounds. She has no " wheezes. She has no rhonchi. She has no rales. She exhibits no tenderness.   Musculoskeletal: Normal range of motion.   Neurological: She is alert and oriented to person, place, and time.   Skin: Skin is warm and dry.   Psychiatric: She has a normal mood and affect. Her speech is normal and behavior is normal. Judgment and thought content normal.   Vitals reviewed.              Assessment/Plan:     1. Laryngitis  - Warm [place] Gargles  - Tylenol  - Voice Rest    Differential diagnosis, natural history, supportive care discussed. Follow-up with primary care provider within 7-10 days, emergency room precautions discussed.  Patient and/or family appears understanding of information.

## 2018-04-27 ENCOUNTER — HOSPITAL ENCOUNTER (OUTPATIENT)
Facility: MEDICAL CENTER | Age: 28
End: 2018-04-27
Attending: NURSE PRACTITIONER
Payer: MEDICAID

## 2018-04-27 ENCOUNTER — INITIAL PRENATAL (OUTPATIENT)
Dept: OBGYN | Facility: CLINIC | Age: 28
End: 2018-04-27

## 2018-04-27 VITALS
SYSTOLIC BLOOD PRESSURE: 112 MMHG | BODY MASS INDEX: 20.73 KG/M2 | DIASTOLIC BLOOD PRESSURE: 64 MMHG | HEIGHT: 63 IN | WEIGHT: 117 LBS

## 2018-04-27 DIAGNOSIS — R21 RASH: ICD-10-CM

## 2018-04-27 DIAGNOSIS — A60.00 PRIMARY GENITAL HERPES SIMPLEX INFECTION: ICD-10-CM

## 2018-04-27 DIAGNOSIS — Z34.00 SUPERVISION OF NORMAL FIRST PREGNANCY, ANTEPARTUM: Primary | ICD-10-CM

## 2018-04-27 PROCEDURE — 87591 N.GONORRHOEAE DNA AMP PROB: CPT

## 2018-04-27 PROCEDURE — 87529 HSV DNA AMP PROBE: CPT | Mod: 91

## 2018-04-27 PROCEDURE — 87491 CHLMYD TRACH DNA AMP PROBE: CPT

## 2018-04-27 PROCEDURE — 59401 PR NEW OB VISIT: CPT | Performed by: NURSE PRACTITIONER

## 2018-04-27 RX ORDER — ACYCLOVIR 400 MG/1
400 TABLET ORAL 3 TIMES DAILY
Qty: 30 TAB | Refills: 0 | Status: SHIPPED | OUTPATIENT
Start: 2018-04-27 | End: 2018-05-07

## 2018-04-27 ASSESSMENT — ENCOUNTER SYMPTOMS
MUSCULOSKELETAL NEGATIVE: 1
DEPRESSION: 1
GASTROINTESTINAL NEGATIVE: 1
EYES NEGATIVE: 1
NEUROLOGICAL NEGATIVE: 1
RESPIRATORY NEGATIVE: 1
CARDIOVASCULAR NEGATIVE: 1
NERVOUS/ANXIOUS: 1
CONSTITUTIONAL NEGATIVE: 1

## 2018-04-27 ASSESSMENT — PATIENT HEALTH QUESTIONNAIRE - PHQ9: CLINICAL INTERPRETATION OF PHQ2 SCORE: 0
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
## 2018-04-27 NOTE — LETTER
Cystic Fibrosis Carrier Testing  Rigoberto Springer    The following information is about a blood test that can be done to determine if you and/or your partner carry the gene for cystic fibrosis.    WHAT IS CYSTIC FIBROSIS?  · Cystic fibrosis (CF) is an inherited disease that affects more than 25,000 American children and young adults.  · Symptoms of CF vary but include lung congestion, pneumonia, diarrhea and poor growth.  Most people with CF have severe medical problems and some die at a young age.  Others have so few symptoms they are unaware they have CF.  · CF does not affect intelligence.  · Although there is no cure for CF at this time, scientists are making progress in improving treatment and in searching for a cure.  In the past many people with CF  at a very young age.  Today, many are living into their 20’s and 30’s.    IS THERE A CHANCE MY BABY COULD HAVE CYSTIC FIBROSIS?  · You can have a child with CF even if there is no history in your family (see chart below).  · CF testing can help determine if you are a carrier and at risk to have a child with CF.  Note: if both parents are carriers, there is a 1 in 4 (25%) chance with each pregnancy that they will have a child with CF.  · Carriers have one normal CF gene and one altered CF gene.  · People with CF have two altered CF genes.  · Most people have two normal copies of the CF gene.    Approximate risk that a couple with no family history of cystic fibrosis will have a child with cystic fibrosis:    Ethnic background / Risk     couple:  1 in 2,500   couple:  1 in 15,000            couple:  1 in 8,000     American couple:  1 in 32,000     WHAT TESTING IS AVAILABLE?  · There is a blood test that can be done to find out if you or your partner is a carrier.  · It is important to understand that CF carrier testing does not detect all CF carriers.  · If the test shows that you are both CF carriers, you unborn  baby can be tested to find out if the baby has CF.    HOW MUCH DOES IT COST TO HAVE CYSTIC FIBROSIS CARRIER TESTING?  · Cost and insurance coverage for CF carrier testing vary depending upon the laboratory used and your insurance policy.  · The average cost for CF carrier testing is $300 per person.  · Your genetic counselor can provide you with more information about cystic fibrosis carrier testing.    _____  Yes, I am interested in discussing carrier testing with a genetic counselor.    _____  No, I am not interested in CF carrier testing or in receiving more information about CF carrier testing.      Client signature: ________________________________________  4/27/2018

## 2018-04-28 DIAGNOSIS — A60.00 PRIMARY GENITAL HERPES SIMPLEX INFECTION: ICD-10-CM

## 2018-04-28 LAB
C TRACH DNA SPEC QL NAA+PROBE: NEGATIVE
N GONORRHOEA DNA SPEC QL NAA+PROBE: NEGATIVE
SPECIMEN SOURCE: NORMAL

## 2018-04-29 LAB
HSV1+2 DNA SPEC QL NAA+PROBE: NORMAL
SIGNIFICANT IND 70042: NORMAL
SITE SITE: NORMAL
SOURCE SOURCE: NORMAL

## 2018-05-02 ENCOUNTER — TELEPHONE (OUTPATIENT)
Dept: OBGYN | Facility: CLINIC | Age: 28
End: 2018-05-02

## 2018-05-02 NOTE — LETTER
May 2, 2018          Dear Rigoberto Springer,      Please call us regarding your care.  One of the nurses is available to speak with you Monday through Friday, 8 a.m. to 5 p.m.    Please call us at [phone]; thank you for your prompt attention.        Sincerely,          Joce Kaur Ass't    Electronically Signed

## 2018-05-02 NOTE — TELEPHONE ENCOUNTER
Notes recorded by Tiarra Toney C.N.M. on 4/30/2018 at 1:12 PM PDT  Please call patient with this result. Have her continue antibiotics. We will repeat this test at her next visit as I feel this may be a false negative.    Unable to contact or leave msg VM not set up.  Letter will be send out today

## 2018-05-07 ENCOUNTER — TELEPHONE (OUTPATIENT)
Dept: OBGYN | Facility: CLINIC | Age: 28
End: 2018-05-07

## 2018-05-07 DIAGNOSIS — B00.9 HERPES INFECTION: ICD-10-CM

## 2018-05-07 RX ORDER — VALACYCLOVIR HYDROCHLORIDE 1 G/1
1000 TABLET, FILM COATED ORAL 2 TIMES DAILY
Qty: 20 TAB | Refills: 0 | Status: ON HOLD | OUTPATIENT
Start: 2018-05-07 | End: 2018-06-23

## 2018-05-07 NOTE — TELEPHONE ENCOUNTER
Pt called today 05/07/2018 at 11:50 am   States that on 05/02/2018 was seen by Feng Mayen C.N.M and was prescribed Acyclovir for herpes and pt started to take the medicine but the sore are not going away but got another sore and are very painful. Pt would like to know if she can get different medication    Dra Yvonne Moreno.   prescribed Valtrex and was send to the pharmacy of preference   Pt was informed and verbalized understanding. Not further questions

## 2018-05-08 ENCOUNTER — TELEPHONE (OUTPATIENT)
Dept: OBGYN | Facility: CLINIC | Age: 28
End: 2018-05-08

## 2018-05-08 NOTE — TELEPHONE ENCOUNTER
My dermatology referral was sent to an office in [place]...is there any way I can get the referral to someone in [place]? Or is that something I need to speak with insurance about?     -Haritha Vieira C.N.M   suggested  To call her insurance to see why they sent her to [facility] and  Get more information about it.  And if theres anything that Provider can do to help if the insurance have another dermatology in [place], provider will be happy to send a new referral   Pt was informed and verbalized understanding. Not further questions

## 2018-05-11 ENCOUNTER — APPOINTMENT (OUTPATIENT)
Dept: RADIOLOGY | Facility: IMAGING CENTER | Age: 28
End: 2018-05-11
Attending: NURSE PRACTITIONER

## 2018-05-11 DIAGNOSIS — Z34.00 SUPERVISION OF NORMAL FIRST PREGNANCY, ANTEPARTUM: ICD-10-CM

## 2018-05-11 PROCEDURE — 76805 OB US >/= 14 WKS SNGL FETUS: CPT | Mod: TC | Performed by: OBSTETRICS & GYNECOLOGY

## 2018-05-13 ENCOUNTER — DATING (OUTPATIENT)
Dept: OBGYN | Facility: CLINIC | Age: 28
End: 2018-05-13

## 2018-05-14 ENCOUNTER — PATIENT MESSAGE (OUTPATIENT)
Dept: OBGYN | Facility: CLINIC | Age: 28
End: 2018-05-14

## 2018-05-14 ENCOUNTER — TELEPHONE (OUTPATIENT)
Dept: OBGYN | Facility: CLINIC | Age: 28
End: 2018-05-14

## 2018-05-14 NOTE — TELEPHONE ENCOUNTER
Pt called triage line stating that the Acyclovir and Valtrex she was prescribed had not helped with her symptoms. And she was concerned and wanted to know what she should do. I consulted with Tiarra Toney C.N.M. She stated that the pt continue to take her Rx and also make an appt with the MD'S. I let the pt know and I made her an appt for May 16 th at 3.15 pm. I let pt know date and time and she had no further questions or concerns.

## 2018-05-16 ENCOUNTER — ROUTINE PRENATAL (OUTPATIENT)
Dept: OBGYN | Facility: CLINIC | Age: 28
End: 2018-05-16

## 2018-05-16 VITALS — WEIGHT: 119 LBS | DIASTOLIC BLOOD PRESSURE: 66 MMHG | SYSTOLIC BLOOD PRESSURE: 110 MMHG | BODY MASS INDEX: 21.08 KG/M2

## 2018-05-16 DIAGNOSIS — N89.8 VAGINAL IRRITATION: ICD-10-CM

## 2018-05-16 PROCEDURE — 90040 PR PRENATAL FOLLOW UP: CPT | Performed by: OBSTETRICS & GYNECOLOGY

## 2018-05-16 NOTE — PROGRESS NOTES
OB Followup;    21w0d    Patient Active Problem List    Diagnosis Date Noted   • HSV-1 (herpes simplex virus 1) infection 05/09/2017     Priority: Medium   • Mild persistent asthma without complication 05/09/2017     Priority: Medium   • Bipolar affective (CMS-HCC) 08/17/2009     Priority: Medium   • acne 08/17/2009     Priority: Medium   • Primary genital herpes simplex infection 04/27/2018   • Rash 04/27/2018   • Preventative health care 08/17/2009       Vitals:    05/16/18 1514   BP: 110/66   Weight: 54 kg (119 lb)       Patient presents for followup of OB care. Currently doing well . Good fetal movement no leakage of fluid no contractions or vaginal bleeding    Patient complains of painful lesions around vagina on the outside she states this is been going on for 2 months      Size equals dates, normal fetal heart rate  Lesions around rectum and left side of vagina consistent with condyloma acuminatum    Labs; she has not performed prenatal labs including RPR  Instructed to perform labs as soon as possible to check RPR  Patient did have GC chlamydia and HSV cultures performed 2 weeks ago which were negative    Lidocaine jelly 2% to apply 3 times daily as needed  Check RPR  Patient states she will perform prenatal labs which include RPR      Followup in 4 weeks

## 2018-05-16 NOTE — PROGRESS NOTES
Pt here for vaginal sores   Denies VB, LOF, or UC  Phone#: [phone]  Pharmacy Confirmed.  C/O: Vaginal sores, x2 months.

## 2018-06-11 ENCOUNTER — ROUTINE PRENATAL (OUTPATIENT)
Dept: OBGYN | Facility: CLINIC | Age: 28
End: 2018-06-11

## 2018-06-11 DIAGNOSIS — O09.92 ENCOUNTER FOR SUPERVISION OF HIGH RISK PREGNANCY IN SECOND TRIMESTER, ANTEPARTUM: Primary | ICD-10-CM

## 2018-06-11 DIAGNOSIS — O98.312: ICD-10-CM

## 2018-06-11 DIAGNOSIS — A63.0: ICD-10-CM

## 2018-06-11 PROCEDURE — 90040 PR PRENATAL FOLLOW UP: CPT | Performed by: NURSE PRACTITIONER

## 2018-06-11 NOTE — PROGRESS NOTES
Pt here today for OB follow up  Pt states no complaints   Reports +  Good # [phone]  Pharmacy Confirmed.  Pt given 1 hr GTT and instructions.  Pt states will do PNP on 6/12/18

## 2018-06-11 NOTE — PROGRESS NOTES
S:  Pt is  at 24w5d for routine OB follow up.  Reports difficulty with bowel movements due to condyloma of rectum, was given Rx of 2% lidocaine gel, feels as though condylomas are becoming worse and wants to know if there is anything else to be done. Has not gotten pnls done Reports good FM.  Denies VB, LOF, RUCs or vaginal DC.    O:  Please see above vitals.        FHTs: 150        Fundal ht: 150 cm.        S=D        Complete OB US: normal fetal survey.    A:  IUP at 24w5d  Patient Active Problem List    Diagnosis Date Noted   • HSV-1 (herpes simplex virus 1) infection 2017     Priority: Medium   • Mild persistent asthma without complication 2017     Priority: Medium   • Bipolar affective (CMS-[facility]) 2009     Priority: Medium   • acne 2009     Priority: Medium   • Maternal condyloma acuminatum affecting pregnancy in second trimester, antepartum 2018   • Primary genital herpes simplex infection 2018   • Rash 2018   • Preventative health care 2009        P:  1.  Will have pt follow up with physician to re-evaluate condylomas          2.  Questions answered.          3.  Encourage adequate water intake.        4.  1hr GTT, ordered.  Lab slip and instructions given to pt.        5.   labor precautions reviewed.         6.  F/u 4 wks.        7.  Will get pnls done with 1 hr GTT

## 2018-06-14 ENCOUNTER — HOSPITAL ENCOUNTER (OUTPATIENT)
Dept: LAB | Facility: MEDICAL CENTER | Age: 28
End: 2018-06-14
Attending: NURSE PRACTITIONER
Payer: MEDICAID

## 2018-06-14 DIAGNOSIS — O09.92 ENCOUNTER FOR SUPERVISION OF HIGH RISK PREGNANCY IN SECOND TRIMESTER, ANTEPARTUM: ICD-10-CM

## 2018-06-14 DIAGNOSIS — Z34.00 SUPERVISION OF NORMAL FIRST PREGNANCY, ANTEPARTUM: ICD-10-CM

## 2018-06-14 LAB
ABO GROUP BLD: NORMAL
APPEARANCE UR: CLEAR
BACTERIA #/AREA URNS HPF: ABNORMAL /HPF
BASOPHILS # BLD AUTO: 0.8 % (ref 0–1.8)
BASOPHILS # BLD: 0.1 K/UL (ref 0–0.12)
BILIRUB UR QL STRIP.AUTO: NEGATIVE
BLD GP AB SCN SERPL QL: NORMAL
COLOR UR: YELLOW
EOSINOPHIL # BLD AUTO: 0.11 K/UL (ref 0–0.51)
EOSINOPHIL NFR BLD: 0.9 % (ref 0–6.9)
EPI CELLS #/AREA URNS HPF: ABNORMAL /HPF
ERYTHROCYTE [DISTWIDTH] IN BLOOD BY AUTOMATED COUNT: 50.1 FL (ref 35.9–50)
GLUCOSE 1H P 50 G GLC PO SERPL-MCNC: 121 MG/DL (ref 70–139)
GLUCOSE UR STRIP.AUTO-MCNC: NEGATIVE MG/DL
HBV SURFACE AG SER QL: NEGATIVE
HCT VFR BLD AUTO: 33.9 % (ref 37–47)
HGB BLD-MCNC: 10.8 G/DL (ref 12–16)
HIV 1+2 AB+HIV1 P24 AG SERPL QL IA: NON REACTIVE
HYALINE CASTS #/AREA URNS LPF: ABNORMAL /LPF
IMM GRANULOCYTES # BLD AUTO: 0.07 K/UL (ref 0–0.11)
IMM GRANULOCYTES NFR BLD AUTO: 0.6 % (ref 0–0.9)
KETONES UR STRIP.AUTO-MCNC: NEGATIVE MG/DL
LEUKOCYTE ESTERASE UR QL STRIP.AUTO: ABNORMAL
LYMPHOCYTES # BLD AUTO: 1.32 K/UL (ref 1–4.8)
LYMPHOCYTES NFR BLD: 10.5 % (ref 22–41)
MCH RBC QN AUTO: 29.7 PG (ref 27–33)
MCHC RBC AUTO-ENTMCNC: 31.9 G/DL (ref 33.6–35)
MCV RBC AUTO: 93.1 FL (ref 81.4–97.8)
MICRO URNS: ABNORMAL
MONOCYTES # BLD AUTO: 0.52 K/UL (ref 0–0.85)
MONOCYTES NFR BLD AUTO: 4.1 % (ref 0–13.4)
NEUTROPHILS # BLD AUTO: 10.48 K/UL (ref 2–7.15)
NEUTROPHILS NFR BLD: 83.1 % (ref 44–72)
NITRITE UR QL STRIP.AUTO: NEGATIVE
NRBC # BLD AUTO: 0 K/UL
NRBC BLD-RTO: 0 /100 WBC
PH UR STRIP.AUTO: 6.5 [PH]
PLATELET # BLD AUTO: 330 K/UL (ref 164–446)
PMV BLD AUTO: 9.2 FL (ref 9–12.9)
PROT UR QL STRIP: 30 MG/DL
RBC # BLD AUTO: 3.64 M/UL (ref 4.2–5.4)
RBC # URNS HPF: ABNORMAL /HPF
RBC UR QL AUTO: NEGATIVE
RH BLD: NORMAL
RPR SER QL: REACTIVE
RPR SER-TITR: NORMAL {TITER}
RUBV AB SER QL: 51.8 IU/ML
SP GR UR STRIP.AUTO: 1.03
TREPONEMA PALLIDUM IGG+IGM AB [PRESENCE] IN SERUM OR PLASMA BY IMMUNOASSAY: REACTIVE
UROBILINOGEN UR STRIP.AUTO-MCNC: 0.2 MG/DL
WBC # BLD AUTO: 12.6 K/UL (ref 4.8–10.8)
WBC #/AREA URNS HPF: ABNORMAL /HPF

## 2018-06-14 PROCEDURE — 86850 RBC ANTIBODY SCREEN: CPT

## 2018-06-14 PROCEDURE — 86900 BLOOD TYPING SEROLOGIC ABO: CPT

## 2018-06-14 PROCEDURE — 86592 SYPHILIS TEST NON-TREP QUAL: CPT

## 2018-06-14 PROCEDURE — 87389 HIV-1 AG W/HIV-1&-2 AB AG IA: CPT

## 2018-06-14 PROCEDURE — 86762 RUBELLA ANTIBODY: CPT

## 2018-06-14 PROCEDURE — 86593 SYPHILIS TEST NON-TREP QUANT: CPT

## 2018-06-14 PROCEDURE — 87340 HEPATITIS B SURFACE AG IA: CPT

## 2018-06-14 PROCEDURE — 36415 COLL VENOUS BLD VENIPUNCTURE: CPT

## 2018-06-14 PROCEDURE — 86901 BLOOD TYPING SEROLOGIC RH(D): CPT

## 2018-06-14 PROCEDURE — 81001 URINALYSIS AUTO W/SCOPE: CPT

## 2018-06-14 PROCEDURE — 82950 GLUCOSE TEST: CPT

## 2018-06-14 PROCEDURE — 85025 COMPLETE CBC W/AUTO DIFF WBC: CPT

## 2018-06-14 PROCEDURE — 86780 TREPONEMA PALLIDUM: CPT

## 2018-06-15 ENCOUNTER — TELEPHONE (OUTPATIENT)
Dept: OBGYN | Facility: CLINIC | Age: 28
End: 2018-06-15

## 2018-06-15 NOTE — TELEPHONE ENCOUNTER
Pt notified of infection, advised to get treatment from [facility], info provided.  I spoke with Sujatha @ [facility].

## 2018-06-15 NOTE — TELEPHONE ENCOUNTER
----- Message from Tiffanie Trinidad M.D. sent at 6/15/2018  1:42 PM PDT -----  Pt needs to get treatment for Sy: Benzathine PCN G 2.4 million units IM as single dose  Report to [facility]

## 2018-06-23 ENCOUNTER — HOSPITAL ENCOUNTER (INPATIENT)
Facility: MEDICAL CENTER | Age: 28
LOS: 1 days | End: 2018-06-24
Attending: OBSTETRICS & GYNECOLOGY | Admitting: OBSTETRICS & GYNECOLOGY
Payer: MEDICAID

## 2018-06-23 LAB
AMPHET UR QL SCN: NEGATIVE
APPEARANCE UR: CLEAR
BARBITURATES UR QL SCN: NEGATIVE
BASOPHILS # BLD AUTO: 0.4 % (ref 0–1.8)
BASOPHILS # BLD: 0.04 K/UL (ref 0–0.12)
BENZODIAZ UR QL SCN: NEGATIVE
BZE UR QL SCN: NEGATIVE
CANNABINOIDS UR QL SCN: POSITIVE
COLOR UR AUTO: YELLOW
EOSINOPHIL # BLD AUTO: 0.11 K/UL (ref 0–0.51)
EOSINOPHIL NFR BLD: 1.1 % (ref 0–6.9)
ERYTHROCYTE [DISTWIDTH] IN BLOOD BY AUTOMATED COUNT: 45.8 FL (ref 35.9–50)
GLUCOSE UR QL STRIP.AUTO: NEGATIVE MG/DL
HCT VFR BLD AUTO: 32.6 % (ref 37–47)
HGB BLD-MCNC: 11 G/DL (ref 12–16)
HOLDING TUBE BB 8507: NORMAL
IMM GRANULOCYTES # BLD AUTO: 0.04 K/UL (ref 0–0.11)
IMM GRANULOCYTES NFR BLD AUTO: 0.4 % (ref 0–0.9)
KETONES UR QL STRIP.AUTO: NEGATIVE MG/DL
LEUKOCYTE ESTERASE UR QL STRIP.AUTO: ABNORMAL
LYMPHOCYTES # BLD AUTO: 1.57 K/UL (ref 1–4.8)
LYMPHOCYTES NFR BLD: 15.7 % (ref 22–41)
MCH RBC QN AUTO: 29.6 PG (ref 27–33)
MCHC RBC AUTO-ENTMCNC: 33.7 G/DL (ref 33.6–35)
MCV RBC AUTO: 87.6 FL (ref 81.4–97.8)
METHADONE UR QL SCN: NEGATIVE
MONOCYTES # BLD AUTO: 0.51 K/UL (ref 0–0.85)
MONOCYTES NFR BLD AUTO: 5.1 % (ref 0–13.4)
NEUTROPHILS # BLD AUTO: 7.76 K/UL (ref 2–7.15)
NEUTROPHILS NFR BLD: 77.3 % (ref 44–72)
NITRITE UR QL STRIP.AUTO: NEGATIVE
NRBC # BLD AUTO: 0 K/UL
NRBC BLD-RTO: 0 /100 WBC
OPIATES UR QL SCN: NEGATIVE
OXYCODONE UR QL SCN: NEGATIVE
PCP UR QL SCN: NEGATIVE
PH UR STRIP.AUTO: 6.5 [PH]
PLATELET # BLD AUTO: 305 K/UL (ref 164–446)
PMV BLD AUTO: 8.9 FL (ref 9–12.9)
PROPOXYPH UR QL SCN: NEGATIVE
PROT UR QL STRIP: ABNORMAL MG/DL
RBC # BLD AUTO: 3.72 M/UL (ref 4.2–5.4)
RBC UR QL AUTO: NEGATIVE
SP GR UR: 1.02
WBC # BLD AUTO: 10 K/UL (ref 4.8–10.8)

## 2018-06-23 PROCEDURE — A9270 NON-COVERED ITEM OR SERVICE: HCPCS | Performed by: STUDENT IN AN ORGANIZED HEALTH CARE EDUCATION/TRAINING PROGRAM

## 2018-06-23 PROCEDURE — 700111 HCHG RX REV CODE 636 W/ 250 OVERRIDE (IP): Performed by: ANESTHESIOLOGY

## 2018-06-23 PROCEDURE — 4A1HX4Z MONITORING OF PRODUCTS OF CONCEPTION, CARDIAC ELECTRICAL ACTIVITY, EXTERNAL APPROACH: ICD-10-PCS | Performed by: OBSTETRICS & GYNECOLOGY

## 2018-06-23 PROCEDURE — 700105 HCHG RX REV CODE 258: Performed by: STUDENT IN AN ORGANIZED HEALTH CARE EDUCATION/TRAINING PROGRAM

## 2018-06-23 PROCEDURE — 3E0P7VZ INTRODUCTION OF HORMONE INTO FEMALE REPRODUCTIVE, VIA NATURAL OR ARTIFICIAL OPENING: ICD-10-PCS | Performed by: OBSTETRICS & GYNECOLOGY

## 2018-06-23 PROCEDURE — 88341 IMHCHEM/IMCYTCHM EA ADD ANTB: CPT

## 2018-06-23 PROCEDURE — 700105 HCHG RX REV CODE 258

## 2018-06-23 PROCEDURE — 700111 HCHG RX REV CODE 636 W/ 250 OVERRIDE (IP): Performed by: STUDENT IN AN ORGANIZED HEALTH CARE EDUCATION/TRAINING PROGRAM

## 2018-06-23 PROCEDURE — 85025 COMPLETE CBC W/AUTO DIFF WBC: CPT

## 2018-06-23 PROCEDURE — 700101 HCHG RX REV CODE 250

## 2018-06-23 PROCEDURE — 88307 TISSUE EXAM BY PATHOLOGIST: CPT

## 2018-06-23 PROCEDURE — 88342 IMHCHEM/IMCYTCHM 1ST ANTB: CPT

## 2018-06-23 PROCEDURE — 81002 URINALYSIS NONAUTO W/O SCOPE: CPT

## 2018-06-23 PROCEDURE — 700102 HCHG RX REV CODE 250 W/ 637 OVERRIDE(OP): Performed by: STUDENT IN AN ORGANIZED HEALTH CARE EDUCATION/TRAINING PROGRAM

## 2018-06-23 PROCEDURE — 700111 HCHG RX REV CODE 636 W/ 250 OVERRIDE (IP)

## 2018-06-23 PROCEDURE — 80307 DRUG TEST PRSMV CHEM ANLYZR: CPT

## 2018-06-23 PROCEDURE — 770002 HCHG ROOM/CARE - OB PRIVATE (112)

## 2018-06-23 RX ORDER — IBUPROFEN 800 MG/1
800 TABLET ORAL EVERY 8 HOURS PRN
Status: DISCONTINUED | OUTPATIENT
Start: 2018-06-23 | End: 2018-06-24 | Stop reason: HOSPADM

## 2018-06-23 RX ORDER — SODIUM CHLORIDE, SODIUM LACTATE, POTASSIUM CHLORIDE, CALCIUM CHLORIDE 600; 310; 30; 20 MG/100ML; MG/100ML; MG/100ML; MG/100ML
INJECTION, SOLUTION INTRAVENOUS PRN
Status: CANCELLED | OUTPATIENT
Start: 2018-06-23

## 2018-06-23 RX ORDER — COSYNTROPIN 0.25 MG/ML
1 INJECTION, POWDER, FOR SOLUTION INTRAMUSCULAR; INTRAVENOUS ONCE
Status: COMPLETED | OUTPATIENT
Start: 2018-06-23 | End: 2018-06-23

## 2018-06-23 RX ORDER — ALPRAZOLAM 0.25 MG/1
0.25 TABLET ORAL NIGHTLY PRN
Status: DISCONTINUED | OUTPATIENT
Start: 2018-06-23 | End: 2018-06-24 | Stop reason: HOSPADM

## 2018-06-23 RX ORDER — MISOPROSTOL 200 UG/1
600 TABLET ORAL
Status: CANCELLED | OUTPATIENT
Start: 2018-06-23

## 2018-06-23 RX ORDER — MISOPROSTOL 200 UG/1
400 TABLET ORAL EVERY 4 HOURS
Status: DISCONTINUED | OUTPATIENT
Start: 2018-06-23 | End: 2018-06-24

## 2018-06-23 RX ORDER — ONDANSETRON 2 MG/ML
4 INJECTION INTRAMUSCULAR; INTRAVENOUS EVERY 6 HOURS PRN
Status: DISCONTINUED | OUTPATIENT
Start: 2018-06-23 | End: 2018-06-24 | Stop reason: HOSPADM

## 2018-06-23 RX ORDER — SODIUM CHLORIDE, SODIUM LACTATE, POTASSIUM CHLORIDE, CALCIUM CHLORIDE 600; 310; 30; 20 MG/100ML; MG/100ML; MG/100ML; MG/100ML
INJECTION, SOLUTION INTRAVENOUS
Status: COMPLETED
Start: 2018-06-23 | End: 2018-06-23

## 2018-06-23 RX ORDER — MISOPROSTOL 200 UG/1
800 TABLET ORAL
Status: DISCONTINUED | OUTPATIENT
Start: 2018-06-23 | End: 2018-06-24 | Stop reason: HOSPADM

## 2018-06-23 RX ORDER — ROPIVACAINE HYDROCHLORIDE 2 MG/ML
INJECTION, SOLUTION EPIDURAL; INFILTRATION; PERINEURAL
Status: COMPLETED
Start: 2018-06-23 | End: 2018-06-23

## 2018-06-23 RX ORDER — BUPIVACAINE HYDROCHLORIDE 2.5 MG/ML
INJECTION, SOLUTION EPIDURAL; INFILTRATION; INTRACAUDAL
Status: ACTIVE
Start: 2018-06-23 | End: 2018-06-24

## 2018-06-23 RX ORDER — DOCUSATE SODIUM 100 MG/1
100 CAPSULE, LIQUID FILLED ORAL 2 TIMES DAILY PRN
Status: CANCELLED | OUTPATIENT
Start: 2018-06-23

## 2018-06-23 RX ORDER — SODIUM CHLORIDE, SODIUM LACTATE, POTASSIUM CHLORIDE, CALCIUM CHLORIDE 600; 310; 30; 20 MG/100ML; MG/100ML; MG/100ML; MG/100ML
INJECTION, SOLUTION INTRAVENOUS CONTINUOUS
Status: DISPENSED | OUTPATIENT
Start: 2018-06-23 | End: 2018-06-23

## 2018-06-23 RX ORDER — OXYCODONE AND ACETAMINOPHEN 10; 325 MG/1; MG/1
1 TABLET ORAL EVERY 4 HOURS PRN
Status: DISCONTINUED | OUTPATIENT
Start: 2018-06-23 | End: 2018-06-24 | Stop reason: HOSPADM

## 2018-06-23 RX ORDER — ZOLPIDEM TARTRATE 5 MG/1
10 TABLET ORAL NIGHTLY PRN
Status: DISCONTINUED | OUTPATIENT
Start: 2018-06-23 | End: 2018-06-24 | Stop reason: HOSPADM

## 2018-06-23 RX ORDER — ONDANSETRON 4 MG/1
4 TABLET, ORALLY DISINTEGRATING ORAL EVERY 6 HOURS PRN
Status: DISCONTINUED | OUTPATIENT
Start: 2018-06-23 | End: 2018-06-24 | Stop reason: HOSPADM

## 2018-06-23 RX ORDER — ALUMINA, MAGNESIA, AND SIMETHICONE 2400; 2400; 240 MG/30ML; MG/30ML; MG/30ML
30 SUSPENSION ORAL EVERY 6 HOURS PRN
Status: DISCONTINUED | OUTPATIENT
Start: 2018-06-23 | End: 2018-06-24 | Stop reason: HOSPADM

## 2018-06-23 RX ORDER — VITAMIN A ACETATE, BETA CAROTENE, ASCORBIC ACID, CHOLECALCIFEROL, .ALPHA.-TOCOPHEROL ACETATE, DL-, THIAMINE MONONITRATE, RIBOFLAVIN, NIACINAMIDE, PYRIDOXINE HYDROCHLORIDE, FOLIC ACID, CYANOCOBALAMIN, CALCIUM CARBONATE, FERROUS FUMARATE, ZINC OXIDE, CUPRIC OXIDE 3080; 12; 120; 400; 1; 1.84; 3; 20; 22; 920; 25; 200; 27; 10; 2 [IU]/1; UG/1; MG/1; [IU]/1; MG/1; MG/1; MG/1; MG/1; MG/1; [IU]/1; MG/1; MG/1; MG/1; MG/1; MG/1
1 TABLET, FILM COATED ORAL EVERY MORNING
Status: CANCELLED | OUTPATIENT
Start: 2018-06-23

## 2018-06-23 RX ORDER — OXYCODONE HYDROCHLORIDE AND ACETAMINOPHEN 5; 325 MG/1; MG/1
1 TABLET ORAL EVERY 4 HOURS PRN
Status: DISCONTINUED | OUTPATIENT
Start: 2018-06-23 | End: 2018-06-24 | Stop reason: HOSPADM

## 2018-06-23 RX ADMIN — FENTANYL CITRATE 100 MCG: 50 INJECTION INTRAMUSCULAR; INTRAVENOUS at 11:12

## 2018-06-23 RX ADMIN — OXYCODONE HYDROCHLORIDE AND ACETAMINOPHEN 1 TABLET: 10; 325 TABLET ORAL at 16:35

## 2018-06-23 RX ADMIN — SODIUM CHLORIDE, POTASSIUM CHLORIDE, SODIUM LACTATE AND CALCIUM CHLORIDE 1000 ML: 600; 310; 30; 20 INJECTION, SOLUTION INTRAVENOUS at 10:15

## 2018-06-23 RX ADMIN — Medication 2000 ML/HR: at 12:58

## 2018-06-23 RX ADMIN — OXYCODONE HYDROCHLORIDE AND ACETAMINOPHEN 1 TABLET: 10; 325 TABLET ORAL at 20:42

## 2018-06-23 RX ADMIN — Medication 125 ML/HR: at 16:36

## 2018-06-23 RX ADMIN — SODIUM CHLORIDE, POTASSIUM CHLORIDE, SODIUM LACTATE AND CALCIUM CHLORIDE: 600; 310; 30; 20 INJECTION, SOLUTION INTRAVENOUS at 11:00

## 2018-06-23 RX ADMIN — SODIUM CHLORIDE, POTASSIUM CHLORIDE, SODIUM LACTATE AND CALCIUM CHLORIDE: 600; 310; 30; 20 INJECTION, SOLUTION INTRAVENOUS at 12:23

## 2018-06-23 RX ADMIN — FENTANYL CITRATE 100 MCG: 50 INJECTION INTRAMUSCULAR; INTRAVENOUS at 10:10

## 2018-06-23 RX ADMIN — IBUPROFEN 800 MG: 800 TABLET, FILM COATED ORAL at 19:05

## 2018-06-23 RX ADMIN — ROPIVACAINE HYDROCHLORIDE 100 ML: 2 INJECTION, SOLUTION EPIDURAL; INFILTRATION at 12:31

## 2018-06-23 RX ADMIN — COSYNTROPIN 1000 MCG: 0.25 INJECTION, POWDER, FOR SOLUTION INTRAMUSCULAR; INTRAVENOUS at 13:49

## 2018-06-23 ASSESSMENT — PAIN SCALES - GENERAL
PAINLEVEL_OUTOF10: 0
PAINLEVEL_OUTOF10: 0
PAINLEVEL_OUTOF10: 10
PAINLEVEL_OUTOF10: 10

## 2018-06-23 ASSESSMENT — COPD QUESTIONNAIRES
HAVE YOU SMOKED AT LEAST 100 CIGARETTES IN YOUR ENTIRE LIFE: YES
IN THE PAST 12 MONTHS DO YOU DO LESS THAN YOU USED TO BECAUSE OF YOUR BREATHING PROBLEMS: DISAGREE/UNSURE
COPD SCREENING SCORE: 2
DURING THE PAST 4 WEEKS HOW MUCH DID YOU FEEL SHORT OF BREATH: NONE/LITTLE OF THE TIME
DO YOU EVER COUGH UP ANY MUCUS OR PHLEGM?: NO/ONLY WITH OCCASIONAL COLDS OR INFECTIONS

## 2018-06-23 ASSESSMENT — PATIENT HEALTH QUESTIONNAIRE - PHQ9
SUM OF ALL RESPONSES TO PHQ9 QUESTIONS 1 AND 2: 0
1. LITTLE INTEREST OR PLEASURE IN DOING THINGS: NOT AT ALL
2. FEELING DOWN, DEPRESSED, IRRITABLE, OR HOPELESS: NOT AT ALL

## 2018-06-23 ASSESSMENT — LIFESTYLE VARIABLES
ALCOHOL_USE: NO
EVER_SMOKED: YES
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
## 2018-06-23 NOTE — PROGRESS NOTES
0930: Assumed care of pt. AAO, POC discussed, pt and Family verbalized understanding.  1215: Dr. Mares to bedside for placement of epidural. During placement pt jumped and there was a wet tap. Orders received for medication and to leave the epidural catheter in for 24 hours.

## 2018-06-23 NOTE — PROGRESS NOTES
1450: non viable female born per Dr. Zaman. Mother to hold  1530: Placenta taken to lab and handed to Kayla

## 2018-06-23 NOTE — PROGRESS NOTES
"[facility] LABOR AND DELIVERY PROGRESS NOTE    PATIENT ID:  NAME:  Rigoberto Springer  MRN:               [id number]  YOB: 1990     27 y.o. female  at 26w3d - IUFD documented on admission - RUC and cervical change    Subjective: epidural blown - intracthecal anesthesia - well anesthetized right now    positive  For CTXS.   negative Feels pain   negative for LOF  negative for vaginal bleeding.   negative for fetal movement    Objective:    Vitals:    18 0856   BP: 118/77   Pulse: (!) 105   Weight: 53.1 kg (117 lb)   Height: 1.6 m (5' 3\")       Cervix:  3cm/80%/-2  [place]: Uterine Contractions Q3 minutes.   Pain control: intracthecal    Assessment: 27 y.o. female  at 26w3d IUFD     Plan:   1. Expect vaginal delivery   2. Mother desires to hold demised fetus after swaddling  3. Avoid use of hemabate for hx of asthma  "

## 2018-06-23 NOTE — DELIVERY NOTE
VAGINAL DELIVERY PRODEDURE NOTE    PATIENT ID:  NAME:  Rigoberto Springer  MRN:               [id number]  YOB: 1990    Admitted for intrauterine fetal demise.     On 2018 at 1450, this 27 y.o., now 26w3d  female delivered via  under intrathecal anesthesia a already demised female infant w/ devitalized tissue. There was no nuchal cord. Cord was doubly clamped, cut and infant handed to RN in attendance. After gently wrapping in clean blanket,  baby was given to mother. An intact placenta was delivered spontaneously at 1457 with 3 vessel cord. IV pitocin was started after delivery of placenta. Upon vaginal exam, there were no lacerations. Uterine fundus was massaged to firm. Estimated blood loss was 200cc. Patient will be transferred to postpartum in stable condition and infant to  nursery.    Drew Zaman D.O.    Delivery attended by Dr. García who was present for the entire delivery.
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
## 2018-06-23 NOTE — PROGRESS NOTES
0845-pt presents from home with c/o leaking fluid this morning and cramps that were sporadic last night but are now q 2-3 since 0730, no c/o bleeding or other pain, states that she last felt the baby move 2 days ago, attempted to place on monitors, TOCO applied, unable to find FHR, SSE performed, slight pooling noted, SVE 2/50/-3, felt vtx, but unsure at 26 3/7 week,   0910-report given to Dr Zaman, will US   0916-Dr Zaman and Dr García in room with US, fetal demise confirmed  0930-report given to DINA Santiago RN, POC discussed

## 2018-06-23 NOTE — PROGRESS NOTES
[facility] LABOR [facility] DELIVERY PROGRESS NOTE    PATIENT ID:  NAME:  Rigoberto Springer  MRN:               [id number]  YOB: 1990     27 y.o. female  at 26w3d - IUFD  Subjective: good anesthesia - started feeling pressure    positive  For CTXS.   negative Feels pain   positive for LOF  negative for vaginal bleeding.   negative for fetal movement    Objective:    Vitals:    18 1236 18 1241 18 1247 18 1300   BP: 118/68 (!) 98/68 121/62 101/70   Pulse: 79 86 81 76   SpO2:   97% 97%   Weight:       Height:           Cervix:  5cm/90%/+1  [place]: Uterine Contractions Q2-3 minutes.   Pain control: intrathecal    Assessment: 27 y.o. female  at 26w3d - IUFD - making rapid cervical change    Plan:   1. Continue current management and recheck cervix in 20-30 minutes

## 2018-06-24 VITALS
OXYGEN SATURATION: 97 % | HEART RATE: 64 BPM | TEMPERATURE: 97.1 F | DIASTOLIC BLOOD PRESSURE: 64 MMHG | WEIGHT: 117 LBS | RESPIRATION RATE: 18 BRPM | SYSTOLIC BLOOD PRESSURE: 106 MMHG | HEIGHT: 63 IN | BODY MASS INDEX: 20.73 KG/M2

## 2018-06-24 PROCEDURE — 700102 HCHG RX REV CODE 250 W/ 637 OVERRIDE(OP): Performed by: STUDENT IN AN ORGANIZED HEALTH CARE EDUCATION/TRAINING PROGRAM

## 2018-06-24 PROCEDURE — 304965 HCHG RECOVERY SERVICES

## 2018-06-24 PROCEDURE — 36415 COLL VENOUS BLD VENIPUNCTURE: CPT

## 2018-06-24 PROCEDURE — 303615 HCHG EPIDURAL/SPINAL ANESTHESIA FOR LABOR

## 2018-06-24 PROCEDURE — A9270 NON-COVERED ITEM OR SERVICE: HCPCS | Performed by: STUDENT IN AN ORGANIZED HEALTH CARE EDUCATION/TRAINING PROGRAM

## 2018-06-24 PROCEDURE — A9270 NON-COVERED ITEM OR SERVICE: HCPCS | Performed by: PHYSICIAN ASSISTANT

## 2018-06-24 PROCEDURE — 59409 OBSTETRICAL CARE: CPT

## 2018-06-24 PROCEDURE — 700102 HCHG RX REV CODE 250 W/ 637 OVERRIDE(OP): Performed by: PHYSICIAN ASSISTANT

## 2018-06-24 RX ORDER — IBUPROFEN 600 MG/1
600 TABLET ORAL EVERY 6 HOURS PRN
Qty: 30 TAB | Refills: 0 | Status: SHIPPED | OUTPATIENT
Start: 2018-06-24

## 2018-06-24 RX ADMIN — IBUPROFEN 800 MG: 800 TABLET, FILM COATED ORAL at 10:40

## 2018-06-24 RX ADMIN — ALPRAZOLAM 0.25 MG: 0.25 TABLET ORAL at 00:08

## 2018-06-24 RX ADMIN — OXYCODONE HYDROCHLORIDE AND ACETAMINOPHEN 1 TABLET: 5; 325 TABLET ORAL at 10:41

## 2018-06-24 ASSESSMENT — PAIN SCALES - GENERAL: PAINLEVEL_OUTOF10: 5

## 2018-06-24 NOTE — PROGRESS NOTES
1900-rcvd report from dayshift RN and assumed care of pt. Pt medicated with ibuprofen for pain.  1930-RN spoke with Dr. Davila with anesthesia and orders rcvd to cap epidural line and keep clean. May DC after 24 hours.  Family and friends at bedside for support. Pt appropriate for situation.  0008-xanax given per request.  0700-pt had a restful night with no complaints. Baby remains in demise room.
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
## 2018-06-24 NOTE — PROGRESS NOTES
0700. Report from Patty GARDNER. POC discussed and resumed.    1320. Pt given dc instructions with infection precautions, spinal HA precautions, no intercourse and nothing in the vagina for 6 weeks. Pt is to follow up with [facility] in 5 weeks. Mementos given. Support group information given. Pt and FOB have no questions at this time.    1340. Pt out with FOB in stable condition.

## 2018-06-25 ENCOUNTER — HOSPITAL ENCOUNTER (OUTPATIENT)
Facility: MEDICAL CENTER | Age: 28
End: 2018-06-25
Attending: OBSTETRICS & GYNECOLOGY | Admitting: OBSTETRICS & GYNECOLOGY
Payer: MEDICAID

## 2018-06-25 VITALS — DIASTOLIC BLOOD PRESSURE: 94 MMHG | HEART RATE: 70 BPM | SYSTOLIC BLOOD PRESSURE: 143 MMHG

## 2018-06-25 PROCEDURE — 62273 INJECT EPIDURAL PATCH: CPT

## 2018-06-25 NOTE — PROGRESS NOTES
1530-pt presents from home with c/o s/s of spinal headache, was known to have a wet tap with her epidural and was given precautions, pt states that her headache is better while lying down and worse upon standing, Dr Porras notified  1550-blood patch performed, pt tolerated well  1630-pt sitting up some, states that her back is sore, but that her headache is getting better  1700-pt states that she is feeling much better except for a sore back, TC Dr Porras, discharge order received  1705-pt discharged home with instructions to come back if headache returns, verbalized understanding, left via wc with mom

## 2018-06-25 NOTE — PROCEDURES
This is a 27 year old  female who had a fetal demise at 26 weeks gestation and subsequentely had a vaginal delivery on 18 under neuraxial anesthesia. Of note, she had a dural puncture on epidural placement and the anesthesiologist placed an intrathecal catheter. After delivery, the catheter was left in place for 24 hours and the patient was given 1mg cosyntropin via IV. She presents with severe, positional frontal headache with associated neck stiffness. The symptoms completely resolve with supine positioning. She denies any other neurologic symptoms such as weakness and numbness/tingling. She denies fever. On exam she has no focal neurologic deficits and her vital signs are stable. The risks and benefits of the epidural blood patch were explained to the patient and she agreed to proceed with blood patch placement.    She was positioned right lateral decubitus and her back was prepped and draped in sterile fashion. Her previous intrathecal catheter site appeared to be at L4-5 and lidocaine was used on the skin at approximately L4-5. A 17G Touhy was advanced and loss of resistance with normal saline occurred at approximately 4.5cm. There was no evidence of bleeding or CSF upon entry to the epidural space. At that time, her nurse rc blood via the patient's right antecubital in a sterile fashion. Blood was injected slowly via the Tuohy in increments of 5mLs. The patient reported moderate back pressure when 18-20mL of blood had been injected and the procedure was stopped and the Tuohy removed. There were no apparent complications. The patient will remain supine for 30 minutes and her headache will be reassessed at the time.    Please contact me with any questions or concerns.    Sudeep Porras MD  [facility]

## 2018-07-16 NOTE — ADDENDUM NOTE
Encounter addended by: Suly Valerio R.N. on: 7/16/2018  2:35 PM<BR>    Actions taken: Utilization Review saved, Flowsheet accepted

## 2018-08-13 VITALS — BODY MASS INDEX: 20.73 KG/M2 | SYSTOLIC BLOOD PRESSURE: 110 MMHG | DIASTOLIC BLOOD PRESSURE: 68 MMHG | WEIGHT: 117 LBS

## 2022-11-03 NOTE — PROGRESS NOTES
Spoke to patient on the phone. She states that her rash only had mild improvement on the topical Kenalog cream. Spoke to patient's midwife Tiarra Toney C.N.M. at the [facility] Center about the patient's condition. She is in agreement that we should start patient on a low-dose prednisone course. 20 mg daily ×5 days. She also states a referral to an allergist is appropriate. Per up-to-date low-dose short course of oral prednisone is safe during this stage of pregnancy. She is instructed to continue over-the-counter lotions for moisturizing. If any new symptoms develop such as fever, vaginal bleeding, any new symptoms. She is to return to clinic immediately. All questions answered. Patient is in agreement with the plan.   Adequate: hears normal conversation without difficulty